# Patient Record
Sex: FEMALE | Race: BLACK OR AFRICAN AMERICAN | Employment: FULL TIME | URBAN - METROPOLITAN AREA
[De-identification: names, ages, dates, MRNs, and addresses within clinical notes are randomized per-mention and may not be internally consistent; named-entity substitution may affect disease eponyms.]

---

## 2018-07-25 PROBLEM — N76.0 BV (BACTERIAL VAGINOSIS): Status: ACTIVE | Noted: 2018-07-25

## 2018-07-25 PROBLEM — B96.89 BV (BACTERIAL VAGINOSIS): Status: ACTIVE | Noted: 2018-07-25

## 2019-04-26 PROBLEM — A59.9 TRICHOMONAS INFECTION: Status: ACTIVE | Noted: 2019-04-26

## 2019-11-14 PROBLEM — R10.2 TENDERNESS OF FEMALE PELVIC ORGANS: Status: ACTIVE | Noted: 2019-11-14

## 2019-11-14 PROBLEM — N94.6 DYSMENORRHEA: Status: ACTIVE | Noted: 2019-11-14

## 2020-10-19 ENCOUNTER — HOSPITAL ENCOUNTER (OUTPATIENT)
Dept: MAMMOGRAPHY | Age: 40
Discharge: HOME | End: 2020-10-19
Attending: OBSTETRICS & GYNECOLOGY
Payer: COMMERCIAL

## 2020-10-19 DIAGNOSIS — N64.89 BREAST ASYMMETRY: ICD-10-CM

## 2020-10-19 PROCEDURE — 76642 ULTRASOUND BREAST LIMITED: CPT

## 2020-10-19 PROCEDURE — 77061 BREAST TOMOSYNTHESIS UNI: CPT

## 2021-10-21 ENCOUNTER — HOSPITAL ENCOUNTER (OUTPATIENT)
Dept: MAMMOGRAPHY | Age: 41
Discharge: HOME | End: 2021-10-21
Attending: OBSTETRICS & GYNECOLOGY
Payer: COMMERCIAL

## 2021-10-21 DIAGNOSIS — Z12.31 VISIT FOR SCREENING MAMMOGRAM: ICD-10-CM

## 2021-10-21 PROCEDURE — 77067 SCR MAMMO BI INCL CAD: CPT

## 2022-03-18 PROBLEM — N94.6 DYSMENORRHEA: Status: ACTIVE | Noted: 2019-11-14

## 2022-03-19 PROBLEM — A59.9 TRICHOMONAS INFECTION: Status: ACTIVE | Noted: 2019-04-26

## 2022-03-19 PROBLEM — N76.0 BV (BACTERIAL VAGINOSIS): Status: ACTIVE | Noted: 2018-07-25

## 2022-03-19 PROBLEM — B96.89 BV (BACTERIAL VAGINOSIS): Status: ACTIVE | Noted: 2018-07-25

## 2022-03-19 PROBLEM — R10.2 TENDERNESS OF FEMALE PELVIC ORGANS: Status: ACTIVE | Noted: 2019-11-14

## 2022-10-17 ENCOUNTER — OFFICE VISIT (OUTPATIENT)
Dept: GYNECOLOGY | Age: 42
End: 2022-10-17
Payer: COMMERCIAL

## 2022-10-17 VITALS
HEIGHT: 65 IN | WEIGHT: 146 LBS | BODY MASS INDEX: 24.32 KG/M2 | SYSTOLIC BLOOD PRESSURE: 124 MMHG | DIASTOLIC BLOOD PRESSURE: 88 MMHG

## 2022-10-17 DIAGNOSIS — N92.1 MENOMETRORRHAGIA: ICD-10-CM

## 2022-10-17 DIAGNOSIS — Z01.419 ENCOUNTER FOR WELL WOMAN EXAM WITH ROUTINE GYNECOLOGICAL EXAM: Primary | ICD-10-CM

## 2022-10-17 DIAGNOSIS — Z76.89 ENCOUNTER TO ESTABLISH CARE: ICD-10-CM

## 2022-10-17 DIAGNOSIS — Z12.4 CERVICAL CANCER SCREENING: ICD-10-CM

## 2022-10-17 PROCEDURE — 99396 PREV VISIT EST AGE 40-64: CPT | Performed by: OBSTETRICS & GYNECOLOGY

## 2022-10-17 PROCEDURE — 76830 TRANSVAGINAL US NON-OB: CPT | Performed by: OBSTETRICS & GYNECOLOGY

## 2022-10-17 RX ORDER — MISOPROSTOL 200 UG/1
TABLET ORAL
Qty: 2 TABLET | Refills: 0 | Status: SHIPPED | OUTPATIENT
Start: 2022-10-17 | End: 2022-10-24 | Stop reason: ALTCHOICE
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
## 2022-10-22 ENCOUNTER — HOSPITAL ENCOUNTER (OUTPATIENT)
Dept: MAMMOGRAPHY | Age: 42
Discharge: HOME | End: 2022-10-25
Payer: COMMERCIAL

## 2022-10-22 DIAGNOSIS — Z12.31 ENCOUNTER FOR SCREENING MAMMOGRAM FOR BREAST CANCER: ICD-10-CM

## 2022-10-22 PROCEDURE — 77063 BREAST TOMOSYNTHESIS BI: CPT

## 2022-10-24 ENCOUNTER — OFFICE VISIT (OUTPATIENT)
Dept: GYNECOLOGY | Age: 42
End: 2022-10-24
Payer: COMMERCIAL

## 2022-10-24 VITALS
SYSTOLIC BLOOD PRESSURE: 118 MMHG | HEIGHT: 65 IN | BODY MASS INDEX: 24.32 KG/M2 | DIASTOLIC BLOOD PRESSURE: 78 MMHG | WEIGHT: 146 LBS

## 2022-10-24 DIAGNOSIS — N85.2 ENLARGED UTERUS: ICD-10-CM

## 2022-10-24 DIAGNOSIS — N80.03 ADENOMYOSIS: ICD-10-CM

## 2022-10-24 DIAGNOSIS — N92.1 MENOMETRORRHAGIA: Primary | ICD-10-CM

## 2022-10-24 PROCEDURE — 99213 OFFICE O/P EST LOW 20 MIN: CPT | Performed by: OBSTETRICS & GYNECOLOGY

## 2022-10-24 NOTE — PROGRESS NOTES
FRANCISCA Segovia is a 43 y.o. female seen for endometrial biopsy for enlarged uterus and adenomyosis. She had an ultrasound on 10/17/22. This patient's been experiencing menometrorrhagia. She bleeds for 7 days and has 5 days of no bleeding followed by another 5 days of spotting. Today we had a failed endometrial biopsy despite Cytotec and numerous attempts at cervical dilatation. I discussed the patient with oncology Dr. Gosia Dee will see the patient for consideration of robotic hysterectomy and bilateral salpingectomy with some staging if deemed necessary. Past Medical History, Past Surgical History, Family history, Social History, and Medications were all reviewed with the patient today and updated as necessary. Current Outpatient Medications   Medication Sig    Probiotic Product (PROBIOTIC-10 PO) Take by mouth    Ferrous Sulfate (SLOW FE PO) Take by mouth    Multiple Vitamins-Minerals (MULTIVITAL PO) Take by mouth     No current facility-administered medications for this visit.      No Known Allergies  Past Medical History:   Diagnosis Date    Anemia     Diabetes ([address]     GDM    PCOS (polycystic ovarian syndrome) 2013    Polycystic disease, ovaries      Past Surgical History:   Procedure Laterality Date     SECTION  ; ; 2015    x 3     Family History   Problem Relation Age of Onset    Heart Disease Mother         CHF    Colon Cancer Paternal Grandmother     Heart Disease Maternal Grandmother     Lung Disease Father     Other Father         sarcoidosis    Heart Disease Maternal Grandfather     Cancer Mother 76        Bone    High Cholesterol Mother       Social History     Tobacco Use    Smoking status: Never    Smokeless tobacco: Never   Substance Use Topics    Alcohol use: Yes       Social History     Substance and Sexual Activity   Sexual Activity Yes    Partners: Male    Birth control/protection: None     OB History    Para Term  AB Living   3 0 3 0 0 3   SAB IAB Ectopic Molar Multiple Live Births   0 0 0 0 0 0       Health Maintenance  Mammogram:   Colonoscopy:   Bone Density:    ROS:    Review of Systems  General: Not Present- Chills, Fever, Fatigue, Insomnia, Hot flashes/Night sweats, Weight gain  Skin: Not Present- Bruising, Change in Wart/Mole, Excessive Sweating, Itching, Nail Changes, New Lesions, Rash, Skin Color Changes and Ulcer. HEENT: Not Present- Headache, Blurred Vision, Double Vision, Glaucoma, Visual Disturbances, Hearing Loss, Ringing in the Ears, Vertigo, Nose Bleed, Bleeding Gums, Hoarseness and Sore Throat. Neck: Not Present- Neck Pain and Neck Swelling. Respiratory: Not Present- Cough, Difficulty Breathing and Difficulty Breathing on Exertion. Breast: Not Present- Breast Mass, Breast Pain, Breast Swelling, Nipple Discharge, Nipple Pain, Recent Breast Size Changes and Skin Changes. Cardiovascular: Not Present- Abnormal Blood Pressure, Chest Pain, Edema, Fainting / Blacking Out, Palpitations, Shortness of Breath and Swelling of Extremities. Gastrointestinal: Not Present- Abdominal Pain, Abdominal Swelling, Bloating, Change in Bowel Habits, Constipation, Diarrhea, Difficulty Swallowing, Gets full quickly at meals, Nausea, Rectal Bleeding and Vomiting. Female Genitourinary: Not Present- Dysmenorrhea, Dyspareunia, Decreased libido, Excessive Menstrual Bleeding, Menstrual Irregularities, Pelvic Pain, Urinary Complaints, Vaginal Discharge, Vaginal itching/burning, Vaginal odor  Musculoskeletal: Not Present- Joint Pain and Muscle Pain. Neurological: Not Present- Dizziness, Fainting, Headaches and Seizures. Psychiatric: Not Present- Anxiety, Depression, Mood changes and Panic Attacks. Endocrine: Not Present- Appetite Changes, Cold Intolerance, Excessive Thirst, Excessive Urination and Heat Intolerance. Hematology: Not Present- Abnormal Bleeding, Easy Bruising and Enlarged Lymph Nodes.        PHYSICAL EXAM:    /78 (Position: Sitting) Ht 5' 5\" (1.651 m)   Wt 146 lb (66.2 kg)   LMP 10/08/2022   BMI 24.30 kg/m²           Medical problems and test results were reviewed with the patient today. ASSESSMENT and PLAN    1. Menometrorrhagia  -     Vahid Adames MD, Gynecologic Oncology, [place]  2. Enlarged uterus  -     [facility] - Emily Daley MD, Gynecologic Oncology, [place]  3. Adenomyosis  -     [address] [place] [address]  - Emily Daley MD, Gynecologic Oncology, [place]           Time:  I spent  30 minutes in preparing to see patient (including chart review and preparation), obtaining and/or reviewing additional medical history, performing a physical exam and evaluation, documenting clinical information in the electronic health record, independently interpreting results, communicating results to patient, family or caregiver, and/or coordinating care. No follow-ups on file.        Nicola Pickard MD

## 2022-10-26 LAB
CYTOLOGIST CVX/VAG CYTO: NORMAL
CYTOLOGY CVX/VAG DOC THIN PREP: NORMAL
HPV REFLEX: NORMAL
Lab: NORMAL
Lab: NORMAL
PATH REPORT.FINAL DX SPEC: NORMAL
STAT OF ADQ CVX/VAG CYTO-IMP: NORMAL

## 2022-11-18 NOTE — PROGRESS NOTES
Date: 2022        Patient Name: Lia Smith     YOB: 1980          Chief Complaint     Chief Complaint   Patient presents with    New Patient      Menorrhagia/metrorrhagia, chroinc   With enlarged uterus, 'adenomyosis' on office ultrasound   Unable to complete endo bx in office    History of Present Illness   Lia Smith is a 43 y.o. who presents today for scheduled visit. Ref: dr Germaine Tanner    Pt with several years of prolonged menses, but no pain  Has failed ocps and iud attempts at managment    Past Medical History     Past Medical History:   Diagnosis Date    Anemia     Diabetes ([address]     GDM    PCOS (polycystic ovarian syndrome) 2013    Polycystic disease, ovaries         Past Surgical History     Past Surgical History:   Procedure Laterality Date     SECTION  ; ; 2015    x 3        Medications      Current Outpatient Medications:     Ascorbic Acid (VITAMIN C) 250 MG tablet, Take 250 mg by mouth daily, Disp: , Rfl:     Probiotic Product (PROBIOTIC-10 PO), Take by mouth, Disp: , Rfl:     Ferrous Sulfate (SLOW FE PO), Take by mouth, Disp: , Rfl:     Multiple Vitamins-Minerals (MULTIVITAL PO), Take by mouth, Disp: , Rfl:      Allergies   Patient has no known allergies.     Social History     Social History       Tobacco History       Smoking Status  Never      Smokeless Tobacco Use  Never              Alcohol History       Alcohol Use Status  Yes              Drug Use       Drug Use Status  No              Sexual Activity       Sexually Active  Yes Partners  Male Birth Control/Protection  None                    Family History     Family History   Problem Relation Age of Onset    Heart Disease Mother         CHF    Colon Cancer Paternal Grandmother     Heart Disease Maternal Grandmother     Lung Disease Father     Other Father         sarcoidosis    Heart Disease Maternal Grandfather     Cancer Mother 76        Bone    High Cholesterol Mother Review of Systems   Review of Systems   Genitourinary:  Positive for menstrual problem. Physical Exam     ECOG PERFORMANCE STATUS - 0-Fully active, able to carry on all pre-disease performance without restriction. Blood pressure 128/81, pulse 80, temperature 98.7 °F (37.1 °C), temperature source Oral, resp. rate 14, height 5' 5\" (1.651 m), weight 149 lb (67.6 kg), SpO2 100 %. Physical Exam  Vitals and nursing note reviewed. Exam conducted with a chaperone present. Constitutional:       Appearance: She is normal weight. Cardiovascular:      Rate and Rhythm: Normal rate. Heart sounds: Normal heart sounds. Pulmonary:      Effort: Pulmonary effort is normal. No respiratory distress. Neurological:      Mental Status: She is alert. Psychiatric:         Mood and Affect: Mood normal.         Behavior: Behavior normal.         Thought Content: Thought content normal.         Judgment: Judgment normal.       Labs        No results found for this or any previous visit (from the past 48 hour(s)). No results found for:      Pathology      Component Ref Range & Units 10/17/22 0934 9/14/21 1642 8/12/20 1620 7/30/19 0846 6/27/17 1708 12/31/13 1426   Diagnosis:   Comment  Comment R, [facility]  Comment R, CM  Comment R, [facility]  Comment R, CM  Comment R, [facility]    Comment: (NOTE)   NEGATIVE FOR INTRAEPITHELIAL LESION OR MALIGNANCY. THIS SPECIMEN WAS RESCREENED AS PART OF OUR  PROGRAM.        Imaging/Diagnostics Last 24 Hours   No results found. Radiology:    CT Result (most recent):  No results found for this or any previous visit from the past 3650 days. PET Results (most recent):  No results found for this or any previous visit from the past 365 days.     Mammo Results (most recent):  [facility] DEVORA DIGITAL SCREEN BILATERAL 10/22/2022    Narrative  STUDY:  Bilateral digital screening mammogram with CAD and Tomosynthesis    INDICATION:  Screening,    COMPARISON: 10/21/2021, 2020    BREAST DENSITY: The breasts are heterogeneously dense, which may obscure small  masses. (#BDC)    FINDINGS: Bilateral digital mammography was performed, and is interpreted in  conjunction with a computer assisted detection (CAD) system. No suspicious  masses, calcifications, or architectural distortion are identified. There is no  skin thickening or nipple retraction. Bilateral breast tomosynthesis was performed, and is interpreted in conjunction  with a computer assisted detection (CAD) system. No suspicious masses,  calcifications, or architectural distortion are identified otherwise. Impression  No mammographic evidence of malignancy. Recommend annual mammogram in one year. A reminder letter will be scheduled. BI-RADS Assessment Category 1: Negative         US Result (most recent):  US NON OB TRANSVAGINAL     Impression  Gyn Report [facility] Page  [facility]  Patient / Exam Information Date of Exam: 10/17/2022  Name: Corey Mays. Phys: Dr. Arben Duncan  Patient ID: [id number] : 1980 Ref. Phys:  Age: 43 Sonographer: Janet Villatoro RDMS  Indication: aub Sex: Female Exam Type: TV GYN  LMP  LMP Day of Cycle  AB  Day of stim. Expected Ovul. Para Ectopic  2D Measurements Value m1 m2 m3 m4 m5 m6 Meth. Uterus  Length 11.15 cm 11.15 avg. Width 6.87 cm 6.87 avg. Height 5.94 cm 5.94 avg. Volume 238.241 cm³ 238.241  Endo. Thickness 8.54 mm 8.54 avg. Left Ovary  Length 3.98 cm 3.98 avg. Width 2.48 cm 2.48 avg. Height 2.52 cm 2.52 avg. Volume 13.024 cm³ 13.024  Right Ovary  Length 3.70 cm 3.70 avg. Width 2.92 cm 2.92 avg. Height 2.47 cm 2.47 avg. Volume 13.973 cm³ 13.973  Comment  [id number]----[id number]  HISTORY: Heavy period for first 3 days then after she stops period she spots for several days.   COMPARISON: Ultrasound 19---heterogeneous enlarged uterus  ---------------------------------------------------------------------------------------------------------------  Enlarged uterus = 9wks with diffuse adenomyosis. Endometrium = 8.5mm and appears normal  Rt ovary is normal.  Lt ovary is normal.  No free fluid noted in the pelvis. Date: 10/17/2022 Perf. Physician: Dr. Melanie Dutta: Sosa Carter RDMS         Assessment       Diagnosis Orders   1. Menometrorrhagia        2. Abnormal uterine bleeding due to adenomyosis          Specialty Problems    None      Plan   1.   Offered to consider hyst form management, pt wishes to furhter consider, so will then proceed with d and c hysteroscopy  Rba reviewed              Homer Zelaya MD  [address] [place] [address] Hematology and Oncology  [phone] Dr [place] [id number]  Office : [phone]  Fax : [phone]

## 2022-11-22 ENCOUNTER — HOSPITAL ENCOUNTER (OUTPATIENT)
Dept: LAB | Age: 42
Discharge: HOME | End: 2022-11-25

## 2022-11-22 ENCOUNTER — OFFICE VISIT (OUTPATIENT)
Dept: ONCOLOGY | Age: 42
End: 2022-11-22
Payer: COMMERCIAL

## 2022-11-22 VITALS
RESPIRATION RATE: 14 BRPM | HEIGHT: 65 IN | HEART RATE: 80 BPM | OXYGEN SATURATION: 100 % | WEIGHT: 149 LBS | SYSTOLIC BLOOD PRESSURE: 128 MMHG | DIASTOLIC BLOOD PRESSURE: 81 MMHG | BODY MASS INDEX: 24.83 KG/M2 | TEMPERATURE: 98.7 F

## 2022-11-22 DIAGNOSIS — N92.1 MENOMETRORRHAGIA: Primary | ICD-10-CM

## 2022-11-22 DIAGNOSIS — N93.9 ABNORMAL UTERINE BLEEDING DUE TO ADENOMYOSIS: ICD-10-CM

## 2022-11-22 DIAGNOSIS — N80.03 ABNORMAL UTERINE BLEEDING DUE TO ADENOMYOSIS: ICD-10-CM

## 2022-11-22 PROCEDURE — 99203 OFFICE O/P NEW LOW 30 MIN: CPT | Performed by: OBSTETRICS & GYNECOLOGY

## 2022-11-22 RX ORDER — MULTIVIT WITH MINERALS/LUTEIN
250 TABLET ORAL DAILY
COMMUNITY

## 2022-11-22 ASSESSMENT — PATIENT HEALTH QUESTIONNAIRE - PHQ9
SUM OF ALL RESPONSES TO PHQ QUESTIONS 1-9: 0
SUM OF ALL RESPONSES TO PHQ9 QUESTIONS 1 & 2: 0
SUM OF ALL RESPONSES TO PHQ QUESTIONS 1-9: 0
2. FEELING DOWN, DEPRESSED OR HOPELESS: 0
1. LITTLE INTEREST OR PLEASURE IN DOING THINGS: 0

## 2022-11-22 NOTE — PATIENT INSTRUCTIONS
Patient Instructions from Today's Visit    Reason for Visit:  New Patient    Diagnosis Information:  [email or web address]-[email or web address]-[email or web address]-[email or web address]      Plan:  Surgery D&C hysteroscopy    Follow Up: After surgery    Recent Lab Results:  N/a    Treatment Summary has been discussed and given to patient: n/a        -------------------------------------------------------------------------------------------------------------------    Patient does express an interest in My Chart. My Chart log in information explained on the after visit summary printout at the [facility] Yoandy Daniels  desk.     Sabas Dodd, RN, MSN, OCN  Gynecology Nurse Navigator for Dr. Stalin Lake  [address] [place] [address]  Phone:  [phone]  Fax: [phone]  Email: [email or web address]

## 2022-12-01 ENCOUNTER — PREP FOR PROCEDURE (OUTPATIENT)
Dept: ONCOLOGY | Age: 42
End: 2022-12-01

## 2022-12-01 PROBLEM — N93.9 ABNORMAL UTERINE BLEEDING DUE TO ADENOMYOSIS: Status: ACTIVE | Noted: 2022-12-01

## 2022-12-01 PROBLEM — N80.03 ABNORMAL UTERINE BLEEDING DUE TO ADENOMYOSIS: Status: ACTIVE | Noted: 2022-12-01

## 2022-12-15 ENCOUNTER — PREP FOR PROCEDURE (OUTPATIENT)
Dept: ONCOLOGY | Age: 42
End: 2022-12-15

## 2022-12-15 RX ORDER — SODIUM CHLORIDE 9 MG/ML
INJECTION, SOLUTION INTRAVENOUS PRN
OUTPATIENT
Start: 2022-12-15

## 2022-12-15 RX ORDER — SODIUM CHLORIDE 0.9 % (FLUSH) 0.9 %
5-40 SYRINGE (ML) INJECTION EVERY 12 HOURS SCHEDULED
OUTPATIENT
Start: 2022-12-15

## 2022-12-15 RX ORDER — SODIUM CHLORIDE 0.9 % (FLUSH) 0.9 %
5-40 SYRINGE (ML) INJECTION PRN
OUTPATIENT
Start: 2022-12-15

## 2022-12-16 ENCOUNTER — TELEPHONE (OUTPATIENT)
Dept: ONCOLOGY | Age: 42
End: 2022-12-16

## 2022-12-19 ENCOUNTER — ANESTHESIA EVENT (OUTPATIENT)
Dept: SURGERY | Age: 42
End: 2022-12-19
Payer: COMMERCIAL

## 2022-12-19 NOTE — PERIOP NOTE
Directly informed patient and or family member of pre op arrival time 1000 on 12/20. All questions answered. Pre op instructions reviewed. Left contact information for any additional questions or needs.

## 2022-12-19 NOTE — PERIOP NOTE
Patient verified name and . Order for consent  found in EHR and matches case posting; patient verifies procedure. Type 1B surgery, Phone assessment complete. Orders  received. Labs per surgeon: H/H DOS  Labs per anesthesia protocol: no additional.    Patient answered medical/surgical history questions at their best of ability. All prior to admission medications documented in [facility]. Patient instructed to take the following medications the day of surgery according to anesthesia guidelines with a small sip of water: none. Hold all vitamins 7 days prior to surgery and NSAIDS 5 days prior to surgery. Prescription meds to hold:Vitamin D, Multivitamin, Vitamin C, and Apple Cider Vinegar. Patient instructed on the following:    > Arrive at [facility], time of arrival to be called the day before by 1700  > NPO after midnight, unless otherwise indicated, including gum, mints, and ice chips  > Responsible adult must drive patient to the hospital, stay during surgery, and patient will need supervision 24 hours after anesthesia  > Use antibacterial Soap in shower the night before surgery and on the morning of surgery  > All piercings must be removed prior to arrival.    > Leave all valuables (money and jewelry) at home but bring insurance card and ID on DOS.   > You may be required to pay a deductible or co-pay on the day of your procedure. You can pre-pay by calling [phone] if your surgery is at the [facility] or [phone] if your surgery is at the [facility]. > Do not wear make-up, nail polish, lotions, cologne, perfumes, powders, or oil on skin. Artificial nails are not permitted.

## 2022-12-20 ENCOUNTER — HOSPITAL ENCOUNTER (OUTPATIENT)
Age: 42
Setting detail: OUTPATIENT SURGERY
Discharge: HOME | End: 2022-12-20
Attending: OBSTETRICS & GYNECOLOGY | Admitting: OBSTETRICS & GYNECOLOGY
Payer: COMMERCIAL

## 2022-12-20 ENCOUNTER — ANESTHESIA (OUTPATIENT)
Dept: SURGERY | Age: 42
End: 2022-12-20
Payer: COMMERCIAL

## 2022-12-20 VITALS
BODY MASS INDEX: 24.69 KG/M2 | OXYGEN SATURATION: 99 % | RESPIRATION RATE: 18 BRPM | SYSTOLIC BLOOD PRESSURE: 145 MMHG | HEART RATE: 67 BPM | TEMPERATURE: 97.8 F | WEIGHT: 148.2 LBS | HEIGHT: 65 IN | DIASTOLIC BLOOD PRESSURE: 79 MMHG

## 2022-12-20 DIAGNOSIS — N80.03 ABNORMAL UTERINE BLEEDING DUE TO ADENOMYOSIS: ICD-10-CM

## 2022-12-20 DIAGNOSIS — N93.9 ABNORMAL UTERINE BLEEDING DUE TO ADENOMYOSIS: ICD-10-CM

## 2022-12-20 LAB
HCG UR QL: NEGATIVE
HCT VFR BLD AUTO: 42.6 % (ref 35.8–46.3)
HGB BLD-MCNC: 13.2 G/DL (ref 11.7–15.4)

## 2022-12-20 PROCEDURE — 3600000013 HC SURGERY LEVEL 3 ADDTL 15MIN: Performed by: OBSTETRICS & GYNECOLOGY

## 2022-12-20 PROCEDURE — 3600000003 HC SURGERY LEVEL 3 BASE: Performed by: OBSTETRICS & GYNECOLOGY

## 2022-12-20 PROCEDURE — 7100000001 HC PACU RECOVERY - ADDTL 15 MIN: Performed by: OBSTETRICS & GYNECOLOGY

## 2022-12-20 PROCEDURE — 7100000011 HC PHASE II RECOVERY - ADDTL 15 MIN: Performed by: OBSTETRICS & GYNECOLOGY

## 2022-12-20 PROCEDURE — 7100000000 HC PACU RECOVERY - FIRST 15 MIN: Performed by: OBSTETRICS & GYNECOLOGY

## 2022-12-20 PROCEDURE — 7100000010 HC PHASE II RECOVERY - FIRST 15 MIN: Performed by: OBSTETRICS & GYNECOLOGY

## 2022-12-20 PROCEDURE — 88305 TISSUE EXAM BY PATHOLOGIST: CPT

## 2022-12-20 PROCEDURE — 3700000000 HC ANESTHESIA ATTENDED CARE: Performed by: OBSTETRICS & GYNECOLOGY

## 2022-12-20 PROCEDURE — 81025 URINE PREGNANCY TEST: CPT

## 2022-12-20 PROCEDURE — 3700000001 HC ADD 15 MINUTES (ANESTHESIA): Performed by: OBSTETRICS & GYNECOLOGY

## 2022-12-20 PROCEDURE — 6360000002 HC RX W HCPCS: Performed by: NURSE ANESTHETIST, CERTIFIED REGISTERED

## 2022-12-20 PROCEDURE — 2709999900 HC NON-CHARGEABLE SUPPLY: Performed by: OBSTETRICS & GYNECOLOGY

## 2022-12-20 PROCEDURE — 2580000003 HC RX 258: Performed by: ANESTHESIOLOGY

## 2022-12-20 PROCEDURE — 2500000003 HC RX 250 WO HCPCS: Performed by: NURSE ANESTHETIST, CERTIFIED REGISTERED

## 2022-12-20 PROCEDURE — 85014 HEMATOCRIT: CPT

## 2022-12-20 PROCEDURE — 6370000000 HC RX 637 (ALT 250 FOR IP): Performed by: ANESTHESIOLOGY

## 2022-12-20 PROCEDURE — 6360000002 HC RX W HCPCS: Performed by: OBSTETRICS & GYNECOLOGY

## 2022-12-20 RX ORDER — LIDOCAINE HYDROCHLORIDE 20 MG/ML
INJECTION, SOLUTION EPIDURAL; INFILTRATION; INTRACAUDAL; PERINEURAL PRN
Status: DISCONTINUED | OUTPATIENT
Start: 2022-12-20 | End: 2022-12-20 | Stop reason: SDUPTHER

## 2022-12-20 RX ORDER — ACETAMINOPHEN 500 MG
1000 TABLET ORAL ONCE
Status: COMPLETED | OUTPATIENT
Start: 2022-12-20 | End: 2022-12-20

## 2022-12-20 RX ORDER — KETOROLAC TROMETHAMINE 30 MG/ML
INJECTION, SOLUTION INTRAMUSCULAR; INTRAVENOUS PRN
Status: DISCONTINUED | OUTPATIENT
Start: 2022-12-20 | End: 2022-12-20 | Stop reason: SDUPTHER

## 2022-12-20 RX ORDER — PROPOFOL 10 MG/ML
INJECTION, EMULSION INTRAVENOUS PRN
Status: DISCONTINUED | OUTPATIENT
Start: 2022-12-20 | End: 2022-12-20 | Stop reason: SDUPTHER

## 2022-12-20 RX ORDER — MIDAZOLAM HYDROCHLORIDE 2 MG/2ML
2 INJECTION, SOLUTION INTRAMUSCULAR; INTRAVENOUS
Status: DISCONTINUED | OUTPATIENT
Start: 2022-12-20 | End: 2022-12-20 | Stop reason: HOSPADM

## 2022-12-20 RX ORDER — ONDANSETRON 4 MG/1
4 TABLET, FILM COATED ORAL 3 TIMES DAILY PRN
Qty: 15 TABLET | Refills: 0 | Status: SHIPPED | OUTPATIENT
Start: 2022-12-20

## 2022-12-20 RX ORDER — LABETALOL HYDROCHLORIDE 5 MG/ML
10 INJECTION, SOLUTION INTRAVENOUS
Status: DISCONTINUED | OUTPATIENT
Start: 2022-12-20 | End: 2022-12-20 | Stop reason: HOSPADM

## 2022-12-20 RX ORDER — IBUPROFEN 800 MG/1
800 TABLET ORAL 3 TIMES DAILY PRN
Qty: 10 TABLET | Refills: 0 | Status: SHIPPED | OUTPATIENT
Start: 2022-12-20

## 2022-12-20 RX ORDER — HYDRALAZINE HYDROCHLORIDE 20 MG/ML
10 INJECTION INTRAMUSCULAR; INTRAVENOUS
Status: DISCONTINUED | OUTPATIENT
Start: 2022-12-20 | End: 2022-12-20 | Stop reason: HOSPADM

## 2022-12-20 RX ORDER — ONDANSETRON 2 MG/ML
INJECTION INTRAMUSCULAR; INTRAVENOUS PRN
Status: DISCONTINUED | OUTPATIENT
Start: 2022-12-20 | End: 2022-12-20 | Stop reason: SDUPTHER

## 2022-12-20 RX ORDER — LIDOCAINE HYDROCHLORIDE 10 MG/ML
1 INJECTION, SOLUTION INFILTRATION; PERINEURAL
Status: DISCONTINUED | OUTPATIENT
Start: 2022-12-20 | End: 2022-12-20 | Stop reason: HOSPADM

## 2022-12-20 RX ORDER — SODIUM CHLORIDE 0.9 % (FLUSH) 0.9 %
5-40 SYRINGE (ML) INJECTION EVERY 12 HOURS SCHEDULED
Status: DISCONTINUED | OUTPATIENT
Start: 2022-12-20 | End: 2022-12-20 | Stop reason: HOSPADM

## 2022-12-20 RX ORDER — EPHEDRINE SULFATE/0.9% NACL/PF 50 MG/5 ML
SYRINGE (ML) INTRAVENOUS PRN
Status: DISCONTINUED | OUTPATIENT
Start: 2022-12-20 | End: 2022-12-20 | Stop reason: SDUPTHER

## 2022-12-20 RX ORDER — SODIUM CHLORIDE, SODIUM LACTATE, POTASSIUM CHLORIDE, CALCIUM CHLORIDE 600; 310; 30; 20 MG/100ML; MG/100ML; MG/100ML; MG/100ML
INJECTION, SOLUTION INTRAVENOUS CONTINUOUS
Status: DISCONTINUED | OUTPATIENT
Start: 2022-12-20 | End: 2022-12-20 | Stop reason: HOSPADM

## 2022-12-20 RX ORDER — ONDANSETRON 2 MG/ML
4 INJECTION INTRAMUSCULAR; INTRAVENOUS
Status: DISCONTINUED | OUTPATIENT
Start: 2022-12-20 | End: 2022-12-20 | Stop reason: HOSPADM

## 2022-12-20 RX ORDER — SODIUM CHLORIDE 9 MG/ML
INJECTION, SOLUTION INTRAVENOUS PRN
Status: DISCONTINUED | OUTPATIENT
Start: 2022-12-20 | End: 2022-12-20 | Stop reason: HOSPADM

## 2022-12-20 RX ORDER — SODIUM CHLORIDE 0.9 % (FLUSH) 0.9 %
5-40 SYRINGE (ML) INJECTION PRN
Status: DISCONTINUED | OUTPATIENT
Start: 2022-12-20 | End: 2022-12-20 | Stop reason: HOSPADM

## 2022-12-20 RX ORDER — PROCHLORPERAZINE EDISYLATE 5 MG/ML
5 INJECTION INTRAMUSCULAR; INTRAVENOUS
Status: DISCONTINUED | OUTPATIENT
Start: 2022-12-20 | End: 2022-12-20 | Stop reason: HOSPADM

## 2022-12-20 RX ORDER — HYDROMORPHONE HCL 110MG/55ML
0.5 PATIENT CONTROLLED ANALGESIA SYRINGE INTRAVENOUS EVERY 5 MIN PRN
Status: DISCONTINUED | OUTPATIENT
Start: 2022-12-20 | End: 2022-12-20 | Stop reason: HOSPADM

## 2022-12-20 RX ORDER — OXYCODONE HYDROCHLORIDE 5 MG/1
5 TABLET ORAL
Status: DISCONTINUED | OUTPATIENT
Start: 2022-12-20 | End: 2022-12-20 | Stop reason: HOSPADM

## 2022-12-20 RX ORDER — DEXAMETHASONE SODIUM PHOSPHATE 4 MG/ML
INJECTION, SOLUTION INTRA-ARTICULAR; INTRALESIONAL; INTRAMUSCULAR; INTRAVENOUS; SOFT TISSUE PRN
Status: DISCONTINUED | OUTPATIENT
Start: 2022-12-20 | End: 2022-12-20 | Stop reason: SDUPTHER

## 2022-12-20 RX ORDER — FENTANYL CITRATE 50 UG/ML
100 INJECTION, SOLUTION INTRAMUSCULAR; INTRAVENOUS
Status: DISCONTINUED | OUTPATIENT
Start: 2022-12-20 | End: 2022-12-20 | Stop reason: HOSPADM

## 2022-12-20 RX ORDER — HYDROMORPHONE HCL 110MG/55ML
0.25 PATIENT CONTROLLED ANALGESIA SYRINGE INTRAVENOUS EVERY 5 MIN PRN
Status: DISCONTINUED | OUTPATIENT
Start: 2022-12-20 | End: 2022-12-20 | Stop reason: HOSPADM

## 2022-12-20 RX ADMIN — ONDANSETRON 4 MG: 2 INJECTION INTRAMUSCULAR; INTRAVENOUS at 14:51

## 2022-12-20 RX ADMIN — KETOROLAC TROMETHAMINE 30 MG: 30 INJECTION, SOLUTION INTRAMUSCULAR; INTRAVENOUS at 15:21

## 2022-12-20 RX ADMIN — FENTANYL CITRATE 50 MCG: 50 INJECTION INTRAMUSCULAR; INTRAVENOUS at 15:18

## 2022-12-20 RX ADMIN — DEXAMETHASONE SODIUM PHOSPHATE 4 MG: 4 INJECTION, SOLUTION INTRAMUSCULAR; INTRAVENOUS at 14:51

## 2022-12-20 RX ADMIN — SODIUM CHLORIDE, SODIUM LACTATE, POTASSIUM CHLORIDE, AND CALCIUM CHLORIDE: 600; 310; 30; 20 INJECTION, SOLUTION INTRAVENOUS at 14:35

## 2022-12-20 RX ADMIN — LIDOCAINE HYDROCHLORIDE 60 MG: 20 INJECTION, SOLUTION EPIDURAL; INFILTRATION; INTRACAUDAL; PERINEURAL at 14:45

## 2022-12-20 RX ADMIN — PROPOFOL 100 MG: 10 INJECTION, EMULSION INTRAVENOUS at 14:48

## 2022-12-20 RX ADMIN — Medication 10 MG: at 15:10

## 2022-12-20 RX ADMIN — ACETAMINOPHEN 1000 MG: 500 TABLET, FILM COATED ORAL at 11:12

## 2022-12-20 RX ADMIN — PROPOFOL 200 MG: 10 INJECTION, EMULSION INTRAVENOUS at 14:45

## 2022-12-20 RX ADMIN — Medication 2000 MG: at 14:50

## 2022-12-20 RX ADMIN — Medication 10 MG: at 14:59

## 2022-12-20 ASSESSMENT — PAIN - FUNCTIONAL ASSESSMENT
PAIN_FUNCTIONAL_ASSESSMENT: NONE - DENIES PAIN
PAIN_FUNCTIONAL_ASSESSMENT: 0-10
PAIN_FUNCTIONAL_ASSESSMENT: 0-10

## 2022-12-20 NOTE — ANESTHESIA PRE PROCEDURE
Department of Anesthesiology  Preprocedure Note       Name:  Lennie Moraes   Age:  43 y.o.  :  1980                                          MRN:  [id number]         Date:  2022      Surgeon: Sabra Davalos):  Dustin Lopez MD    Procedure: Procedure(s):  DILATATION AND CURETTAGE HYSTEROSCOPY    Medications prior to admission:   Prior to Admission medications    Medication Sig Start Date End Date Taking?  Authorizing Provider   VITAMIN D PO Take by mouth nightly   Yes Historical Provider, MD   APPLE CIDER VINEGAR PO Take by mouth as needed   Yes Historical Provider, MD   Ascorbic Acid (VITAMIN C) 250 MG tablet Take 250 mg by mouth nightly    Historical Provider, MD   Probiotic Product (PROBIOTIC-10 PO) Take by mouth nightly    Historical Provider, MD   Ferrous Sulfate (SLOW FE PO) Take by mouth nightly 22   Historical Provider, MD   Multiple Vitamins-Minerals (MULTIVITAL PO) Take by mouth nightly 22   Historical Provider, MD       Current medications:    Current Facility-Administered Medications   Medication Dose Route Frequency Provider Last Rate Last Admin    lidocaine 1 % injection 1 mL  1 mL IntraDERmal Once PRN Antonio Agustin DO        fentaNYL (SUBLIMAZE) injection 100 mcg  100 mcg IntraVENous Once PRN Antonio Agustin DO        lactated ringers infusion   IntraVENous Continuous Antonio Francois DO        midazolam PF (VERSED) injection 2 mg  2 mg IntraVENous Once PRN Antonio Agustin DO        sodium chloride flush 0.9 % injection 5-40 mL  5-40 mL IntraVENous 2 times per day Dustin Lopez MD        sodium chloride flush 0.9 % injection 5-40 mL  5-40 mL IntraVENous PRN Dustin Lopez MD        0.9 % sodium chloride infusion   IntraVENous PRN Dustin Lopez MD        ceFAZolin (ANCEF) 2000 mg in sterile water 20 mL IV syringe  2,000 mg IntraVENous On Call to [address] MD Laila           Allergies:  No Known Allergies    Problem List:    Patient Active Problem List   Diagnosis Code    Dysmenorrhea N94.6    Trichomonas infection A59.9    History of  delivery Z98.891    BV (bacterial vaginosis) N76.0, B96.89    Tenderness of female pelvic organs R10.2    PCOS (polycystic ovarian syndrome) E28.2    Menometrorrhagia N92.1    Abnormal uterine bleeding due to adenomyosis N93.9, N80.03       Past Medical History:        Diagnosis Date    Anemia     no recent transfusions    Anesthesia complication     pt states she is slow to wake up    History of gestational diabetes     +    Menometrorrhagia     PCOS (polycystic ovarian syndrome) 2013       Past Surgical History:        Procedure Laterality Date     SECTION  ; ; 2015    x 3       Social History:    Social History     Tobacco Use    Smoking status: Never    Smokeless tobacco: Never   Substance Use Topics    Alcohol use: Not Currently                                Counseling given: Not Answered      Vital Signs (Current):   Vitals:    22 1012 22 1031   BP:  (!) 127/59   Pulse:  75   Resp:  16   Temp:  98.4 °F (36.9 °C)   TempSrc:  Oral   SpO2:  99%   Weight: 146 lb (66.2 kg) 148 lb 3.2 oz (67.2 kg)   Height: 5' 5\" (1.651 m) 5' 5\" (1.651 m)                                              BP Readings from Last 3 Encounters:   22 (!) 127/59   22 128/81   10/24/22 118/78       NPO Status: Time of last liquid consumption: 0000                        Time of last solid consumption: 0000                        Date of last liquid consumption: 22                        Date of last solid food consumption: 22    BMI:   Wt Readings from Last 3 Encounters:   22 148 lb 3.2 oz (67.2 kg)   22 149 lb (67.6 kg)   10/24/22 146 lb (66.2 kg)     Body mass index is 24.66 kg/m².     CBC:   Lab Results   Component Value Date/Time    HGB 13.2 2022 11:06 AM    HCT 42.6 2022 11:06 AM       CMP: No results found for: NA, K, CL, CO2, BUN, CREATININE, GFRAA, AGRATIO, LABGLOM, GLUCOSE, GLU, PROT, CALCIUM, BILITOT, ALKPHOS, AST, ALT    POC Tests: No results for input(s): POCGLU, POCNA, POCK, POCCL, POCBUN, POCHEMO, POCHCT in the last 72 hours. Coags: No results found for: PROTIME, INR, APTT    HCG (If Applicable):   Lab Results   Component Value Date    PREGTESTUR Negative 12/20/2022        ABGs: No results found for: PHART, PO2ART, ZIT3MIG, YBL2DKD, BEART, K4JJGYHZ     Type & Screen (If Applicable):  No results found for: LABABO, LABRH    Drug/Infectious Status (If Applicable):  No results found for: HIV, HEPCAB    COVID-19 Screening (If Applicable): No results found for: COVID19        Anesthesia Evaluation  Patient summary reviewed and Nursing notes reviewed  Airway: Mallampati: I          Dental: normal exam         Pulmonary:Negative Pulmonary ROS breath sounds clear to auscultation                             Cardiovascular:Negative CV ROS  Exercise tolerance: good (>4 METS),           Rhythm: regular  Rate: normal                    Neuro/Psych:   Negative Neuro/Psych ROS              GI/Hepatic/Renal: Neg GI/Hepatic/Renal ROS            Endo/Other: Negative Endo/Other ROS                    Abdominal:             Vascular: negative vascular ROS. Other Findings:           Anesthesia Plan      general     ASA 1       Induction: intravenous. MIPS: Postoperative opioids intended and Prophylactic antiemetics administered. Anesthetic plan and risks discussed with patient and spouse.                         Pako Mckenzie DO   12/20/2022

## 2022-12-20 NOTE — ANESTHESIA PROCEDURE NOTES
Airway  Date/Time: 12/20/2022 2:49 PM  Urgency: elective    Airway not difficult    General Information and Staff    Patient location during procedure: OR  Resident/CRNA: CHELLE Esquivel - ROMARIO    Indications and Patient Condition  Indications for airway management: anesthesia and airway protection  Spontaneous ventilation: present  Sedation level: deep  Preoxygenated: yes  Patient position: sniffing  MILS not maintained throughout  Mask difficulty assessment: vent by bag mask    Final Airway Details  Final airway type: supraglottic airway      Successful airway: oropharyngeal  Size 4     Number of attempts at approach: 1  Ventilation between attempts: bag mask  Number of other approaches attempted: 0    Additional Comments  Lips/dentition as pre-op.

## 2022-12-20 NOTE — BRIEF OP NOTE
Brief Postoperative Note      Patient: Leora Pearson  YOB: 1980  MRN: [id number]    Date of Procedure: 12/20/2022    Pre-Op Diagnosis: Menometrorrhagia [N92.1]  Abnormal uterine bleeding due to adenomyosis [N93.9, N80.03]    Post-Op Diagnosis: Same       Procedure(s):  DILATATION AND CURETTAGE HYSTEROSCOPY    Surgeon(s):  Simone Ruiz MD    Assistant:  * No surgical staff found *    Anesthesia: General    Estimated Blood Loss (mL): Minimal    Complications: None    Specimens:   ID Type Source Tests Collected by Time Destination   A : Endometrial curettings Tissue Uterus SURGICAL PATHOLOGY Simone Ruiz MD 12/20/2022 1521        Implants:  * No implants in log *      Drains: * No LDAs found *    Findings: enlarged cavity, atrophic appearance    Electronically signed by Simone Ruiz MD on 12/20/2022 at 3:28 PM

## 2022-12-20 NOTE — ANESTHESIA POSTPROCEDURE EVALUATION
Department of Anesthesiology  Postprocedure Note    Patient: Gina Palencia  MRN: [id number]  YOB: 1980  Date of evaluation: 12/20/2022      Procedure Summary     Date: 12/20/22 Room / Location: [facility] MAIN OR 02 / [facility] MAIN OR    Anesthesia Start: 2735 Anesthesia Stop: 5313    Procedure: DILATATION AND CURETTAGE HYSTEROSCOPY (Vagina ) Diagnosis:       Menometrorrhagia      Abnormal uterine bleeding due to adenomyosis      (Menometrorrhagia [N92.1])      (Abnormal uterine bleeding due to adenomyosis [N93.9, N80.03])    Providers: Federica Mason MD Responsible Provider: Joanie Gowers Versie Alba, DO    Anesthesia Type: general ASA Status: 1          Anesthesia Type: No value filed.     Carissa Phase I: Carissa Score: 10    Carissa Phase II: Carissa Score: 10      Anesthesia Post Evaluation    Patient location during evaluation: PACU  Level of consciousness: awake and alert  Airway patency: patent  Nausea & Vomiting: no nausea  Complications: no  Cardiovascular status: hemodynamically stable  Respiratory status: acceptable  Hydration status: euvolemic

## 2022-12-21 NOTE — OP NOTE
[facility]  OPERATIVE REPORT    Name:  Juan Jose Liu  MR#:  [id number]  :  1980  ACCOUNT #:  [de-identified]  DATE OF SERVICE:  2022    PREOPERATIVE DIAGNOSIS:  Menometrorrhagia with anemia. POSTOPERATIVE DIAGNOSIS:  Enlarged uterus with no obvious neoplastic change. PROCEDURE PERFORMED:  Dilation and curettage and hysteroscopy. SURGEON:  Federica Mason MD        ANESTHESIA:  General.        SPECIMENS REMOVED:  Pathology above. ESTIMATED BLOOD LOSS:  Minimal.    PACKS:  None. DRAINS:  None    DISPOSITION:  PACU. INDICATIONS:  This is a patient who was referred with abnormal imaging and menorrhagia with need for iron replacement. Risks, benefits, alternatives, morbidities and mortalities were reviewed and she wished to proceed with above. FINDINGS:  Normal cervix. Uterine cavity was enlarged, but appeared atrophic with no obvious intracavitary neoplasm. The single-tooth tenaculum sites had some bleeding and both were sutured with figure-of-eight. Otherwise no bleeding at completion. PROCEDURE:  The patient was taken to the operating room and placed under general anesthesia, sterilely prepped and draped. Cervix grasped anteriorly, gently dilated after sounding and subsequently hysteroscopy with normal saline undertaken with findings as above. Sharp curettage done with minimal tissue return consistent with relatively atrophic appearance. Bleeding was noted from the tenaculum site since both had a 2-0 Vicryl placed with subsequently no bleeding noted. Sponge, lap and needle counts were correct. The patient tolerated the procedure well and was taken to PACU stable.         Rasheeda Ackerman MD      [id number]  D:  2022 15:31  T:  2022 20:47  JOB #:  [id number]

## 2022-12-22 ENCOUNTER — TELEPHONE (OUTPATIENT)
Dept: CASE MANAGEMENT | Age: 42
End: 2022-12-22

## 2022-12-22 NOTE — TELEPHONE ENCOUNTER
Called Manfred Candelario to see how she was after procedure/surgery. She reports doing well. She reports light spotting. She is aware of POV on 1/4/23 at 945. She is aware to call us with any surgery concerns prior to visit.

## 2023-01-03 NOTE — PROGRESS NOTES
Date: 2023        Patient Name: Laura Cazares     YOB: 1980          Chief Complaint     Chief Complaint   Patient presents with    Follow-up      Menorrhagia/metrorrhagia, chronic   Endometrial polyp, d and c, dec 2022   With enlarged uterus, 'adenomyosis' on office ultrasound   Unable to complete endo bx in office    History of Present Illness   Laura Cazares is a 43 y.o. who presents today for scheduled visit.     Ref: dr Servando Mcdowell    Pt with several years of prolonged menses, but no pain  Ocps tried years ago, not well ntolerated(triphasic) and iud attempts at managment    Past Medical History     Past Medical History:   Diagnosis Date    Anemia     no recent transfusions    Anesthesia complication     pt states she is slow to wake up    History of gestational diabetes     +    Menometrorrhagia     PCOS (polycystic ovarian syndrome) 2013        Past Surgical History     Past Surgical History:   Procedure Laterality Date     SECTION  ; ; 2015    x 3    DILATION AND CURETTAGE OF UTERUS N/A 2022    DILATATION AND CURETTAGE HYSTEROSCOPY performed by Kandy Gomez MD at [facility] MAIN OR        Medications      Current Outpatient Medications:     ondansetron (ZOFRAN) 4 MG tablet, Take 1 tablet by mouth 3 times daily as needed for Nausea or Vomiting, Disp: 15 tablet, Rfl: 0    ibuprofen (ADVIL;MOTRIN) 800 MG tablet, Take 1 tablet by mouth 3 times daily as needed for Pain, Disp: 10 tablet, Rfl: 0    VITAMIN D PO, Take by mouth nightly, Disp: , Rfl:     APPLE CIDER VINEGAR PO, Take by mouth as needed, Disp: , Rfl:     Ascorbic Acid (VITAMIN C) 250 MG tablet, Take 250 mg by mouth nightly, Disp: , Rfl:     Probiotic Product (PROBIOTIC-10 PO), Take by mouth nightly, Disp: , Rfl:     Ferrous Sulfate (SLOW FE PO), Take by mouth nightly, Disp: , Rfl:     Multiple Vitamins-Minerals (MULTIVITAL PO), Take by mouth nightly, Disp: , Rfl:      Allergies   Patient has no known allergies. Social History     Social History       Tobacco History       Smoking Status  Never      Smokeless Tobacco Use  Never              Alcohol History       Alcohol Use Status  Yes              Drug Use       Drug Use Status  No              Sexual Activity       Sexually Active  Yes Partners  Male Birth Control/Protection  None                    Family History     Family History   Problem Relation Age of Onset    Heart Disease Mother         CHF    Colon Cancer Paternal Grandmother     Heart Disease Maternal Grandmother     Lung Disease Father     Other Father         sarcoidosis    Heart Disease Maternal Grandfather     Cancer Mother 76        Bone    High Cholesterol Mother        Review of Systems   Review of Systems   Genitourinary:  Positive for menstrual problem. Physical Exam     ECOG PERFORMANCE STATUS - 0-Fully active, able to carry on all pre-disease performance without restriction. Blood pressure 123/67, pulse 67, temperature 98.8 °F (37.1 °C), temperature source Oral, resp. rate 14, height 5' 5\" (1.651 m), weight 146 lb 14.4 oz (66.6 kg), last menstrual period 12/13/2022, SpO2 100 %. Physical Exam    Labs        No results found for this or any previous visit (from the past 48 hour(s)). No results found for:      Pathology      Name:    María Cos   Accession Number:   [id number]   MR #   [id number]   Date Obtained:   12/20/2022   DIAGNOSIS        \"ENDOMETRIAL CURETTINGS\":  EARLY SECRETORY PHASE; FRAGMENTS   CONSISTENT WITH BENIGN ENDOMETRIAL POLYP. Component Ref Range & Units 10/17/22 0934 9/14/21 1642 8/12/20 1620 7/30/19 0846 6/27/17 1708 12/31/13 1426   Diagnosis:   Comment  Comment R, [facility]  Comment R, [facility]  Comment R, [facility]  Comment R, [facility]  Comment R, [facility]    Comment: (NOTE)   NEGATIVE FOR INTRAEPITHELIAL LESION OR MALIGNANCY.    THIS SPECIMEN WAS RESCREENED AS PART OF OUR  PROGRAM.        Imaging/Diagnostics Last 24 Hours No results found. Radiology:    CT Result (most recent):  No results found for this or any previous visit from the past 3650 days. PET Results (most recent):  No results found for this or any previous visit from the past 365 days. Mammo Results (most recent):  PRESTON DEVORA DIGITAL SCREEN BILATERAL 10/22/2022    Narrative  STUDY:  Bilateral digital screening mammogram with CAD and Tomosynthesis    INDICATION:  Screening,    COMPARISON:  10/21/2021, 2020    BREAST DENSITY: The breasts are heterogeneously dense, which may obscure small  masses. (#BDC)    FINDINGS: Bilateral digital mammography was performed, and is interpreted in  conjunction with a computer assisted detection (CAD) system. No suspicious  masses, calcifications, or architectural distortion are identified. There is no  skin thickening or nipple retraction. Bilateral breast tomosynthesis was performed, and is interpreted in conjunction  with a computer assisted detection (CAD) system. No suspicious masses,  calcifications, or architectural distortion are identified otherwise. Impression  No mammographic evidence of malignancy. Recommend annual mammogram in one year. A reminder letter will be scheduled. BI-RADS Assessment Category 1: Negative         US Result (most recent):  US NON OB TRANSVAGINAL     Impression  Gyn Report [facility]  Patient / Exam Information Date of Exam: 10/17/2022  Name: Milagro Lobitowoody. Phys: Dr. Marjorie Payton  Patient ID: [id number] : 1980 Ref. Phys:  Age: 43 Sonographer: Kiley Parikh RDMS  Indication: aub Sex: Female Exam Type: TV GYN  LMP  LMP Day of Cycle  AB  Day of stim. Expected Ovul. Para Ectopic  2D Measurements Value m1 m2 m3 m4 m5 m6 Meth. Uterus  Length 11.15 cm 11.15 avg. Width 6.87 cm 6.87 avg. Height 5.94 cm 5.94 avg. Volume 238.241 cm³ 238.241  Endo. Thickness 8.54 mm 8.54 avg. Left Ovary  Length 3.98 cm 3.98 avg.   Width 2.48 cm 2.48 avg. Height 2.52 cm 2.52 avg. Volume 13.024 cm³ 13.024  Right Ovary  Length 3.70 cm 3.70 avg. Width 2.92 cm 2.92 avg. Height 2.47 cm 2.47 avg. Volume 13.973 cm³ 13.973  Comment  [id number]----fsn  HISTORY: Heavy period for first 3 days then after she stops period she spots for several days. COMPARISON: Ultrasound 7/30/19---heterogeneous enlarged uterus  ---------------------------------------------------------------------------------------------------------------  Enlarged uterus = 9wks with diffuse adenomyosis. Endometrium = 8.5mm and appears normal  Rt ovary is normal.  Lt ovary is normal.  No free fluid noted in the pelvis. Date: 10/17/2022 Perf. Physician: Dr. Rakesh Velazquez: Jefrey Claude, RDMS         Assessment       Diagnosis Orders   1. Endometrial polyp          Specialty Problems    None      Plan       Reviewed path, benign  Reviewed imaging again  Reviewed menorrhagia issues, offered surgery, pt defers  Discussed another trial ocps? ?  Pt to fu with dr Lul Henry as indicated  Fu here prn  100%^ care coordination, tx planned/option discussion, 15min          Carol Kwong MD  [facility] Hematology and Oncology  [address] [place] [id number]  Office : [phone]  Fax : [phone]

## 2023-01-04 ENCOUNTER — OFFICE VISIT (OUTPATIENT)
Dept: ONCOLOGY | Age: 43
End: 2023-01-04
Payer: COMMERCIAL

## 2023-01-04 VITALS
HEART RATE: 67 BPM | DIASTOLIC BLOOD PRESSURE: 67 MMHG | HEIGHT: 65 IN | OXYGEN SATURATION: 100 % | SYSTOLIC BLOOD PRESSURE: 123 MMHG | BODY MASS INDEX: 24.47 KG/M2 | RESPIRATION RATE: 14 BRPM | TEMPERATURE: 98.8 F | WEIGHT: 146.9 LBS

## 2023-01-04 DIAGNOSIS — N84.0 ENDOMETRIAL POLYP: Primary | ICD-10-CM

## 2023-01-04 PROCEDURE — 99212 OFFICE O/P EST SF 10 MIN: CPT | Performed by: OBSTETRICS & GYNECOLOGY

## 2023-01-04 ASSESSMENT — PATIENT HEALTH QUESTIONNAIRE - PHQ9
SUM OF ALL RESPONSES TO PHQ QUESTIONS 1-9: 0
SUM OF ALL RESPONSES TO PHQ9 QUESTIONS 1 & 2: 0
2. FEELING DOWN, DEPRESSED OR HOPELESS: 0
SUM OF ALL RESPONSES TO PHQ QUESTIONS 1-9: 0
1. LITTLE INTEREST OR PLEASURE IN DOING THINGS: 0

## 2023-02-28 SDOH — HEALTH STABILITY: PHYSICAL HEALTH: ON AVERAGE, HOW MANY MINUTES DO YOU ENGAGE IN EXERCISE AT THIS LEVEL?: 20 MIN

## 2023-02-28 SDOH — HEALTH STABILITY: PHYSICAL HEALTH: ON AVERAGE, HOW MANY DAYS PER WEEK DO YOU ENGAGE IN MODERATE TO STRENUOUS EXERCISE (LIKE A BRISK WALK)?: 2 DAYS

## 2023-03-01 ENCOUNTER — TELEPHONE (OUTPATIENT)
Dept: INTERNAL MEDICINE CLINIC | Facility: CLINIC | Age: 43
End: 2023-03-01

## 2023-03-01 ENCOUNTER — OFFICE VISIT (OUTPATIENT)
Dept: INTERNAL MEDICINE CLINIC | Facility: CLINIC | Age: 43
End: 2023-03-01
Payer: COMMERCIAL

## 2023-03-01 VITALS
WEIGHT: 149 LBS | OXYGEN SATURATION: 98 % | HEART RATE: 76 BPM | HEIGHT: 65 IN | DIASTOLIC BLOOD PRESSURE: 80 MMHG | SYSTOLIC BLOOD PRESSURE: 120 MMHG | BODY MASS INDEX: 24.83 KG/M2 | TEMPERATURE: 97.7 F

## 2023-03-01 DIAGNOSIS — Z00.00 PHYSICAL EXAM: Primary | ICD-10-CM

## 2023-03-01 DIAGNOSIS — Z11.59 NEED FOR HEPATITIS C SCREENING TEST: ICD-10-CM

## 2023-03-01 PROCEDURE — 99386 PREV VISIT NEW AGE 40-64: CPT | Performed by: NURSE PRACTITIONER

## 2023-03-01 SDOH — ECONOMIC STABILITY: INCOME INSECURITY: HOW HARD IS IT FOR YOU TO PAY FOR THE VERY BASICS LIKE FOOD, HOUSING, MEDICAL CARE, AND HEATING?: NOT HARD AT ALL

## 2023-03-01 SDOH — ECONOMIC STABILITY: FOOD INSECURITY: WITHIN THE PAST 12 MONTHS, THE FOOD YOU BOUGHT JUST DIDN'T LAST AND YOU DIDN'T HAVE MONEY TO GET MORE.: NEVER TRUE

## 2023-03-01 SDOH — ECONOMIC STABILITY: FOOD INSECURITY: WITHIN THE PAST 12 MONTHS, YOU WORRIED THAT YOUR FOOD WOULD RUN OUT BEFORE YOU GOT MONEY TO BUY MORE.: NEVER TRUE

## 2023-03-01 SDOH — ECONOMIC STABILITY: HOUSING INSECURITY
IN THE LAST 12 MONTHS, WAS THERE A TIME WHEN YOU DID NOT HAVE A STEADY PLACE TO SLEEP OR SLEPT IN A SHELTER (INCLUDING NOW)?: NO

## 2023-03-01 ASSESSMENT — PATIENT HEALTH QUESTIONNAIRE - PHQ9
SUM OF ALL RESPONSES TO PHQ QUESTIONS 1-9: 0
1. LITTLE INTEREST OR PLEASURE IN DOING THINGS: 0
2. FEELING DOWN, DEPRESSED OR HOPELESS: 0
SUM OF ALL RESPONSES TO PHQ9 QUESTIONS 1 & 2: 0

## 2023-03-01 ASSESSMENT — ENCOUNTER SYMPTOMS
EYE ITCHING: 0
EYE PAIN: 0
NAUSEA: 0
ABDOMINAL PAIN: 0
DIARRHEA: 0
COUGH: 0
SHORTNESS OF BREATH: 0
SINUS PAIN: 0
BACK PAIN: 0
EYE DISCHARGE: 0
COLOR CHANGE: 0
CONSTIPATION: 0
APNEA: 0
ABDOMINAL DISTENTION: 0
EYE REDNESS: 0

## 2023-03-01 NOTE — TELEPHONE ENCOUNTER
----- Message from CHELLE Sullivan CNP sent at 3/1/2023  2:23 PM EST -----  Regarding: Lab review  Please let Arron Rose know that I reviewed her labs and everything looks great. She does have a mild elevation in her LDL but she is still low risk overall for heart disease; continue lifestyle management. Obtain routine labs annually.

## 2023-03-01 NOTE — TELEPHONE ENCOUNTER
Called and advised patient per Vanessa Pass,  labs and everything looks great. She does have a mild elevation in her LDL but she is still low risk overall for heart disease; continue lifestyle management. Obtain routine labs annually.

## 2023-03-01 NOTE — PROGRESS NOTES
[unfilled]  [address] [place] [id number]      PROGRESS NOTE    SUBJECTIVE:   Jose Gates is a 43 y.o. female seen for a follow up visit regarding the following chief complaint:     Chief Complaint   Patient presents with    New Patient     Pt is here to establish care and is requesting a physical exam and lab work for her anemia. HPI    Patient presents as NP for PE. Past history of iron deficient anemia and takes iron for this with last Hgb 13.2;   She has occasional heart flutter that takes her breath away at times. This occurs very random, maybe once every few months and lasts for a few seconds. Recent hx of D&C due to irregular menses; she did have a benign polyp. She was referred to hematology/oncology for biopsy and planning to wait to assess further bleeding. She has continued the bleeding episodes and contemplating hysterectomy. Planning labs at work. Vision screen UTD and wearing glasses for correction; Works as a  in [address] [place] [address];     Current Outpatient Medications   Medication Sig Dispense Refill    VITAMIN D PO Take by mouth nightly      APPLE CIDER VINEGAR PO Take by mouth as needed      Ascorbic Acid (VITAMIN C) 250 MG tablet Take 250 mg by mouth nightly      Probiotic Product (PROBIOTIC-10 PO) Take by mouth nightly      Ferrous Sulfate (SLOW FE PO) Take by mouth nightly      Multiple Vitamins-Minerals (MULTIVITAL PO) Take by mouth nightly      ondansetron (ZOFRAN) 4 MG tablet Take 1 tablet by mouth 3 times daily as needed for Nausea or Vomiting 15 tablet 0    ibuprofen (ADVIL;MOTRIN) 800 MG tablet Take 1 tablet by mouth 3 times daily as needed for Pain 10 tablet 0     No current facility-administered medications for this visit.      No Known Allergies    Past Medical History:   Diagnosis Date    Anemia     no recent transfusions    Anesthesia complication     pt states she is slow to wake up    History of gestational diabetes     +    Menometrorrhagia     PCOS (polycystic ovarian syndrome) 2013     Past Surgical History:   Procedure Laterality Date     SECTION  ; ; 2015    x 3    DILATION AND CURETTAGE OF UTERUS N/A 2022    DILATATION AND CURETTAGE HYSTEROSCOPY performed by Roselyn Otto MD at [facility] MAIN OR     Family History   Problem Relation Age of Onset    Heart Disease Mother         CHF    Colon Cancer Paternal Grandmother     Heart Disease Maternal Grandmother     Lung Disease Father     Other Father         sarcoidosis    Heart Disease Maternal Grandfather     Cancer Mother 76        Bone    High Cholesterol Mother      Social History     Tobacco Use    Smoking status: Never    Smokeless tobacco: Never   Substance Use Topics    Alcohol use: Not Currently       Review of Systems   Constitutional:  Negative for activity change, appetite change, chills, fatigue and fever. HENT:  Negative for congestion, ear pain and sinus pain. Eyes:  Negative for pain, discharge, redness and itching. Respiratory:  Negative for apnea, cough and shortness of breath. Cardiovascular:  Negative for chest pain, palpitations and leg swelling. Gastrointestinal:  Negative for abdominal distention, abdominal pain, constipation, diarrhea and nausea. Endocrine: Negative for cold intolerance and heat intolerance. Genitourinary:  Negative for difficulty urinating, dysuria, enuresis and urgency. Musculoskeletal:  Negative for arthralgias, back pain, joint swelling, myalgias and neck pain. Skin:  Negative for color change and rash. Neurological:  Negative for dizziness, weakness and headaches. Psychiatric/Behavioral:  Negative for behavioral problems and sleep disturbance. The patient is not nervous/anxious.         OBJECTIVE:  /80 (Site: Left Upper Arm, Position: Sitting, Cuff Size: Small Adult)   Pulse 76   Temp 97.7 °F (36.5 °C) (Temporal)   Ht 5' 5\" (1.651 m)   Wt 149 lb (67.6 kg)   LMP 01/02/2023   SpO2 98%   BMI 24.79 kg/m²      Physical Exam  Constitutional:       General: She is not in acute distress. Appearance: Normal appearance. She is not ill-appearing. HENT:      Head: Normocephalic. Right Ear: Tympanic membrane normal.      Left Ear: Tympanic membrane normal.      Nose: No congestion. Mouth/Throat:      Mouth: Mucous membranes are moist.      Pharynx: Oropharynx is clear. No posterior oropharyngeal erythema. Eyes:      Conjunctiva/sclera: Conjunctivae normal.      Pupils: Pupils are equal, round, and reactive to light. Cardiovascular:      Rate and Rhythm: Normal rate and regular rhythm. Heart sounds: No murmur heard. Pulmonary:      Effort: Pulmonary effort is normal. No respiratory distress. Breath sounds: Normal breath sounds. Abdominal:      General: Abdomen is flat. Palpations: Abdomen is soft. Tenderness: There is no abdominal tenderness. Musculoskeletal:         General: Normal range of motion. Cervical back: Normal range of motion. Skin:     General: Skin is warm and dry. Findings: No rash. Neurological:      General: No focal deficit present. Mental Status: She is alert and oriented to person, place, and time. Mental status is at baseline. Psychiatric:         Mood and Affect: Mood normal.         Behavior: Behavior normal.         Thought Content: Thought content normal.         ASSESSMENT and Thai Cortez was seen today for new patient. Diagnoses and all orders for this visit:    Physical exam    Need for hepatitis C screening test  -     Hepatitis C Antibody; Future    Follow with gynecology/oncology routinely. Bring lab records to office to review. Annual Exam: Goals for good health at today's visit:  -Reach and stay at a healthy weight. This can lower your risk of obesity, diabetes, heart disease and high blood pressure. -Get at least 30 minutes of physical activity daily.   Walking, running, swimming, cycling or playing sports are good options.  -Do not smoke or allow others to smoke around you. -Use birth control if you do not want to have children at this time and barrier contraception to prevent or decrease risk of exposure to sexually transmitted diseases. -Use sunscreen daily, SPF of 15 or higher are best.  -See a dentist at least once yearly for checkups. -Have vision checked every 1-2 years.  -Wear a seat belt in the car.  -Avoid drinking in excess of 2 alcoholic drinks per day for men and 1 drink a day for women, or avoid drinking altogether.   -Watch closely for changes in our health and contact your doctor with any concerning problems or symptoms  -Age appropriate screening tests were updated and discussed at today's visit. -Immunization history was reviewed and updated at today's visit. Follow-up and Dispositions    Return in about 1 year (around 3/1/2024) for PE.          Vanessa Rinaldi, CHELLE - CNP

## 2023-10-11 RX ORDER — FLUCONAZOLE 150 MG/1
150 TABLET ORAL DAILY
Qty: 3 TABLET | Refills: 0 | Status: SHIPPED | OUTPATIENT
Start: 2023-10-11 | End: 2023-10-14

## 2023-10-16 NOTE — PROGRESS NOTES
FRANCISCA Benoit is a 37 y.o. female seen for annual GYN exam.  She is having bleeding at least 2 weeks out of each month. She will start off with spotting for several days then have a very heavy period for a full week and then have several days of spotting again. She has tried bcps, IUD and had D&C in Dec which did not help. She had US which showed adenomyosis. She is anemic and is taking iron. She was treated recently for yeast infection. Her symptoms have cleared up but she still has discharge. Past Medical History, Past Surgical History, Family history, Social History, and Medications were all reviewed with the patient today and updated as necessary. Current Outpatient Medications   Medication Sig    VITAMIN D PO Take by mouth nightly    APPLE CIDER VINEGAR PO Take by mouth as needed    Ascorbic Acid (VITAMIN C) 250 MG tablet Take 1 tablet by mouth nightly    Probiotic Product (PROBIOTIC-10 PO) Take by mouth nightly    Ferrous Sulfate (SLOW FE PO) Take by mouth nightly    Multiple Vitamins-Minerals (MULTIVITAL PO) Take by mouth nightly     No current facility-administered medications for this visit.      No Known Allergies  Past Medical History:   Diagnosis Date    Adenomyosis     Anemia     no recent transfusions    Anesthesia complication     pt states she is slow to wake up    Gestational diabetes mellitus     History of gestational diabetes     +    Menometrorrhagia     PCOS (polycystic ovarian syndrome) 2013     Past Surgical History:   Procedure Laterality Date     SECTION  ; ; 2015    x 3    DILATION AND CURETTAGE OF UTERUS N/A 2022    DILATATION AND CURETTAGE HYSTEROSCOPY performed by Barbie Baer MD at [facility] MAIN OR     Family History   Problem Relation Age of Onset    Heart Disease Mother         CHF    Cancer Mother 76        Bone    High Cholesterol Mother     Colon Cancer Paternal Grandmother     Heart Disease Maternal Grandmother     Lung Disease

## 2023-10-18 ENCOUNTER — OFFICE VISIT (OUTPATIENT)
Dept: OBGYN CLINIC | Age: 43
End: 2023-10-18
Payer: COMMERCIAL

## 2023-10-18 VITALS
WEIGHT: 146 LBS | HEIGHT: 66 IN | BODY MASS INDEX: 23.46 KG/M2 | DIASTOLIC BLOOD PRESSURE: 80 MMHG | SYSTOLIC BLOOD PRESSURE: 122 MMHG

## 2023-10-18 DIAGNOSIS — Z12.4 SCREENING FOR MALIGNANT NEOPLASM OF CERVIX: ICD-10-CM

## 2023-10-18 DIAGNOSIS — N80.03 ADENOMYOSIS: ICD-10-CM

## 2023-10-18 DIAGNOSIS — D50.0 IRON DEFICIENCY ANEMIA DUE TO CHRONIC BLOOD LOSS: ICD-10-CM

## 2023-10-18 DIAGNOSIS — Z12.31 VISIT FOR SCREENING MAMMOGRAM: ICD-10-CM

## 2023-10-18 DIAGNOSIS — N92.0 MENORRHAGIA WITH REGULAR CYCLE: ICD-10-CM

## 2023-10-18 DIAGNOSIS — Z01.419 WELL WOMAN EXAM: Primary | ICD-10-CM

## 2023-10-18 DIAGNOSIS — N89.8 VAGINAL ITCHING: ICD-10-CM

## 2023-10-18 LAB — WET PREP (POC): NORMAL

## 2023-10-18 PROCEDURE — 99396 PREV VISIT EST AGE 40-64: CPT | Performed by: OBSTETRICS & GYNECOLOGY

## 2023-10-18 PROCEDURE — 87210 SMEAR WET MOUNT SALINE/INK: CPT | Performed by: OBSTETRICS & GYNECOLOGY

## 2023-11-07 ENCOUNTER — HOSPITAL ENCOUNTER (OUTPATIENT)
Dept: MAMMOGRAPHY | Age: 43
Discharge: HOME | End: 2023-11-10

## 2023-11-07 DIAGNOSIS — Z12.31 VISIT FOR SCREENING MAMMOGRAM: ICD-10-CM

## 2023-12-27 ENCOUNTER — TELEPHONE (OUTPATIENT)
Dept: OBGYN CLINIC | Age: 43
End: 2023-12-27

## 2023-12-27 NOTE — TELEPHONE ENCOUNTER
Mayo Moreno message and she will send surgery order in for patient to be contacted for surgery consult visit.

## 2024-01-03 ENCOUNTER — TELEPHONE (OUTPATIENT)
Dept: OBGYN CLINIC | Age: 44
End: 2024-01-03

## 2024-02-28 ENCOUNTER — OFFICE VISIT (OUTPATIENT)
Dept: OBGYN CLINIC | Age: 44
End: 2024-02-28
Payer: COMMERCIAL

## 2024-02-28 VITALS
HEIGHT: 65 IN | WEIGHT: 151 LBS | BODY MASS INDEX: 25.16 KG/M2 | SYSTOLIC BLOOD PRESSURE: 103 MMHG | DIASTOLIC BLOOD PRESSURE: 69 MMHG

## 2024-02-28 DIAGNOSIS — N94.6 DYSMENORRHEA: Primary | ICD-10-CM

## 2024-02-28 DIAGNOSIS — N92.1 MENOMETRORRHAGIA: ICD-10-CM

## 2024-02-28 DIAGNOSIS — N80.03 ADENOMYOSIS: ICD-10-CM

## 2024-02-28 PROCEDURE — 99204 OFFICE O/P NEW MOD 45 MIN: CPT | Performed by: OBSTETRICS & GYNECOLOGY

## 2024-02-28 NOTE — PROGRESS NOTES
The patient is a 43 y.o. with 3 prior  sections who is seen for referral due to long menses. States had a Dilation and Curettage  by José Miguel Cao with benign findings. States advised and notified by previous Gynecology provider that Endometrial lining has grown into the Uterine wall; Patient states having a Dx of Adenomyosis, and PCOS. Requests  to discuss Partial Hysterectomy and ongoing symptoms. States menses lasts for 2 weeks 1-3 of heavy bleeding, then having a brown discharge the following 7 days.   Patient counseled face to face and records reviewed regarding her complaints, differential diagnosis and disposition with greater than 50% of the time spent in this way.  She is a good candidate for TLH/BSX due to adenomyosis.  She is counseled regarding the challenges with respect to scarring due to prior  sections but desires to avoid abdominal approach if possible.  She is counseled regarding risks of surgery including but not limited to hemorrhage during or after surgery which may result in further surgeries, blood transfusions or death; infection possibly requiring readmission to the hospital and or prolonged antibiotics use or further surgeries to correct; injury to adjacent abdominal structures, bladder or kidney tubes which may require another readmission and or more extensive surgeries to correct as well as remote possibility of surgery not correcting her problem.  She voices understanding these and other risks as described to her and a willingness to proceed.

## 2024-05-15 NOTE — PROGRESS NOTES
Enhanced Recovery After GYN Surgery: non-diabetic patients    It is highly recommended you purchase and drink Ensure Complete - one bottle twice daily for five days starting on 06/26/24. Ensure Complete is the preferred formula over other Ensure formulas. It is recommended that you continue drinking this for one month after surgery.    The night before surgery 07/01/24, drink 2 bottles of the Ensure Pre-Surgery drink.     The morning of surgery 07/02/24, drink one bottle of the Ensure Pre-Surgery drink 2 hours prior to your arrival to the hospital. Drink this over 5-10 minutes.    Drink nothing else after drinking the pre-surgical drink the morning of surgery.    Bring your patient handbook with you to the hospital.    Things to remember:    1. You will be given clear liquids to drink, advancing diet as tolerated    2. You will be up and moving around with assistance 2-4 hours after surgery.    3. You will be given regularly scheduled pain medications (NSAIDS, Tylenol) with narcotics as needed.    4. You may be able to go home that night if the surgeon okays and you are up and eating and drinking. Otherwise, your discharge will be the following morning around lunch time.     5. Continue drinking Ensure Complete for 5 days after surgery.

## 2024-06-03 NOTE — PROGRESS NOTES
Preop Visit    Ms.Bridget KERMIT Ty presents for a preop visit.  She is scheduled for a  ERAS/ HYSTERECTOMY ABDOMINAL LAPAROSCOPIC/ BILATERAL SALPINGECTOMY . .  Her history, meds, and allergies were reviewed.  The procedure was reviewed in detail as well as the risks of bleeding, infection, DVT and potential surgical complications involving the bladder, ureters, colon or intestines.  Also the alternatives,  benefits, recovery and follow-up. Prevention of SSI discussed as indicated.  All of her questions were answered.    Exam:  Lungs CTAB  Heart RRR    See the hospital H&P as well as prior chart for details.

## 2024-06-04 ENCOUNTER — OFFICE VISIT (OUTPATIENT)
Dept: OBGYN CLINIC | Age: 44
End: 2024-06-04

## 2024-06-04 VITALS — BODY MASS INDEX: 25.52 KG/M2 | HEIGHT: 65 IN | WEIGHT: 153.2 LBS

## 2024-06-04 DIAGNOSIS — Z01.818 PREOP EXAMINATION: Primary | ICD-10-CM

## 2024-06-04 PROCEDURE — 99024 POSTOP FOLLOW-UP VISIT: CPT | Performed by: OBSTETRICS & GYNECOLOGY

## 2024-06-12 ENCOUNTER — HOSPITAL ENCOUNTER (OUTPATIENT)
Dept: SURGERY | Age: 44
Discharge: HOME | End: 2024-06-15
Payer: COMMERCIAL

## 2024-06-12 DIAGNOSIS — Z01.818 PRE-OP TESTING: ICD-10-CM

## 2024-06-12 LAB — HGB BLD-MCNC: 10.4 G/DL (ref 11.7–15.4)

## 2024-06-12 PROCEDURE — 85018 HEMOGLOBIN: CPT

## 2024-06-12 PROCEDURE — 36415 COLL VENOUS BLD VENIPUNCTURE: CPT

## 2024-06-26 NOTE — PERIOP NOTE
Patient verified name and     Order for consent NOT found in EHR; patient verified.     Type 2 surgery    Labs per surgeon: No orders received.  Labs per anesthesia protocol: Hgb 10.4 on 24; results within anesthesia guidelines.   T&S DOS; order signed and held in EHR.   EKG: None per anesthesia protocol.       Patient attended GYN class on 24. Patient received all patient education and received hospital approved surgical skin cleanser.    Patient instructed to hold all vitamins 7 days prior to surgery and NSAIDS 5 days prior to surgery, patient verbalized understanding.    Due to anemia, pt instructed to continue iron supplement up until the DOS.     Patient instructed to continue previous medications as prescribed prior to surgery and to take the following medications the day of surgery according to anesthesia guidelines with a small sip of water: NONE.     Patient answered medical/surgical history questions at their best of ability. All prior to admission medications documented in [facility].

## 2024-06-28 ENCOUNTER — TELEPHONE (OUTPATIENT)
Dept: OBGYN CLINIC | Age: 44
End: 2024-06-28

## 2024-06-28 NOTE — TELEPHONE ENCOUNTER
Spoke with pt regarding provider change for upcoming surgery. Pt is okay with surgery being performed by another physician, she will speak with her  to confirm, left my contact info with her if she has any questions

## 2024-07-01 ENCOUNTER — ANESTHESIA EVENT (OUTPATIENT)
Dept: SURGERY | Age: 44
End: 2024-07-01
Payer: COMMERCIAL

## 2024-07-02 ENCOUNTER — HOSPITAL ENCOUNTER (OUTPATIENT)
Age: 44
Discharge: HOME | End: 2024-07-02
Attending: OBSTETRICS & GYNECOLOGY | Admitting: OBSTETRICS & GYNECOLOGY
Payer: COMMERCIAL

## 2024-07-02 ENCOUNTER — ANESTHESIA (OUTPATIENT)
Dept: SURGERY | Age: 44
End: 2024-07-02
Payer: COMMERCIAL

## 2024-07-02 ENCOUNTER — PREP FOR PROCEDURE (OUTPATIENT)
Dept: OBGYN CLINIC | Age: 44
End: 2024-07-02

## 2024-07-02 VITALS
DIASTOLIC BLOOD PRESSURE: 68 MMHG | OXYGEN SATURATION: 98 % | WEIGHT: 151.9 LBS | SYSTOLIC BLOOD PRESSURE: 128 MMHG | HEART RATE: 82 BPM | HEIGHT: 65 IN | RESPIRATION RATE: 14 BRPM | TEMPERATURE: 98.1 F | BODY MASS INDEX: 25.31 KG/M2

## 2024-07-02 DIAGNOSIS — N80.03 ABNORMAL UTERINE BLEEDING DUE TO ADENOMYOSIS: Primary | ICD-10-CM

## 2024-07-02 DIAGNOSIS — N80.03 ADENOMYOSIS: ICD-10-CM

## 2024-07-02 DIAGNOSIS — Z01.818 PRE-OP TESTING: ICD-10-CM

## 2024-07-02 DIAGNOSIS — N93.9 ABNORMAL UTERINE BLEEDING DUE TO ADENOMYOSIS: Primary | ICD-10-CM

## 2024-07-02 PROBLEM — N92.1 MENOMETRORRHAGIA: Status: RESOLVED | Noted: 2022-10-17 | Resolved: 2024-07-02

## 2024-07-02 LAB
ABO + RH BLD: NORMAL
BLOOD GROUP ANTIBODIES SERPL: NORMAL
HCG UR QL: NEGATIVE
SPECIMEN EXP DATE BLD: NORMAL

## 2024-07-02 PROCEDURE — 2580000003 HC RX 258: Performed by: OBSTETRICS & GYNECOLOGY

## 2024-07-02 PROCEDURE — 2580000003 HC RX 258: Performed by: ANESTHESIOLOGY

## 2024-07-02 PROCEDURE — 3600000004 HC SURGERY LEVEL 4 BASE: Performed by: OBSTETRICS & GYNECOLOGY

## 2024-07-02 PROCEDURE — 58571 TLH W/T/O 250 G OR LESS: CPT | Performed by: OBSTETRICS & GYNECOLOGY

## 2024-07-02 PROCEDURE — 7100000000 HC PACU RECOVERY - FIRST 15 MIN: Performed by: OBSTETRICS & GYNECOLOGY

## 2024-07-02 PROCEDURE — 7100000010 HC PHASE II RECOVERY - FIRST 15 MIN: Performed by: OBSTETRICS & GYNECOLOGY

## 2024-07-02 PROCEDURE — 7100000011 HC PHASE II RECOVERY - ADDTL 15 MIN: Performed by: OBSTETRICS & GYNECOLOGY

## 2024-07-02 PROCEDURE — 6360000002 HC RX W HCPCS: Performed by: ANESTHESIOLOGY

## 2024-07-02 PROCEDURE — 86900 BLOOD TYPING SEROLOGIC ABO: CPT

## 2024-07-02 PROCEDURE — 3700000001 HC ADD 15 MINUTES (ANESTHESIA): Performed by: OBSTETRICS & GYNECOLOGY

## 2024-07-02 PROCEDURE — 3600000014 HC SURGERY LEVEL 4 ADDTL 15MIN: Performed by: OBSTETRICS & GYNECOLOGY

## 2024-07-02 PROCEDURE — 81025 URINE PREGNANCY TEST: CPT

## 2024-07-02 PROCEDURE — 7100000001 HC PACU RECOVERY - ADDTL 15 MIN: Performed by: OBSTETRICS & GYNECOLOGY

## 2024-07-02 PROCEDURE — 6360000002 HC RX W HCPCS: Performed by: OBSTETRICS & GYNECOLOGY

## 2024-07-02 PROCEDURE — 2720000010 HC SURG SUPPLY STERILE: Performed by: OBSTETRICS & GYNECOLOGY

## 2024-07-02 PROCEDURE — 2709999900 HC NON-CHARGEABLE SUPPLY: Performed by: OBSTETRICS & GYNECOLOGY

## 2024-07-02 PROCEDURE — 6370000000 HC RX 637 (ALT 250 FOR IP): Performed by: ANESTHESIOLOGY

## 2024-07-02 PROCEDURE — 88307 TISSUE EXAM BY PATHOLOGIST: CPT

## 2024-07-02 PROCEDURE — 6360000002 HC RX W HCPCS: Performed by: NURSE ANESTHETIST, CERTIFIED REGISTERED

## 2024-07-02 PROCEDURE — 2500000003 HC RX 250 WO HCPCS: Performed by: NURSE ANESTHETIST, CERTIFIED REGISTERED

## 2024-07-02 PROCEDURE — 2580000003 HC RX 258: Performed by: NURSE ANESTHETIST, CERTIFIED REGISTERED

## 2024-07-02 PROCEDURE — 3700000000 HC ANESTHESIA ATTENDED CARE: Performed by: OBSTETRICS & GYNECOLOGY

## 2024-07-02 PROCEDURE — 86901 BLOOD TYPING SEROLOGIC RH(D): CPT

## 2024-07-02 PROCEDURE — 58571 TLH W/T/O 250 G OR LESS: CPT | Performed by: STUDENT IN AN ORGANIZED HEALTH CARE EDUCATION/TRAINING PROGRAM

## 2024-07-02 PROCEDURE — 86850 RBC ANTIBODY SCREEN: CPT

## 2024-07-02 RX ORDER — SODIUM CHLORIDE 9 MG/ML
INJECTION, SOLUTION INTRAVENOUS PRN
Status: DISCONTINUED | OUTPATIENT
Start: 2024-07-02 | End: 2024-07-02 | Stop reason: HOSPADM

## 2024-07-02 RX ORDER — CEFOXITIN 1 G/1
INJECTION, POWDER, FOR SOLUTION INTRAVENOUS PRN
Status: DISCONTINUED | OUTPATIENT
Start: 2024-07-02 | End: 2024-07-02

## 2024-07-02 RX ORDER — MIDAZOLAM HYDROCHLORIDE 2 MG/2ML
2 INJECTION, SOLUTION INTRAMUSCULAR; INTRAVENOUS
Status: DISCONTINUED | OUTPATIENT
Start: 2024-07-02 | End: 2024-07-02 | Stop reason: HOSPADM

## 2024-07-02 RX ORDER — LIDOCAINE HYDROCHLORIDE 20 MG/ML
INJECTION, SOLUTION EPIDURAL; INFILTRATION; INTRACAUDAL; PERINEURAL PRN
Status: DISCONTINUED | OUTPATIENT
Start: 2024-07-02 | End: 2024-07-02 | Stop reason: SDUPTHER

## 2024-07-02 RX ORDER — ONDANSETRON 4 MG/1
4 TABLET, ORALLY DISINTEGRATING ORAL EVERY 8 HOURS PRN
Status: CANCELLED | OUTPATIENT
Start: 2024-07-02

## 2024-07-02 RX ORDER — SODIUM CHLORIDE 0.9 % (FLUSH) 0.9 %
5-40 SYRINGE (ML) INJECTION PRN
Status: CANCELLED | OUTPATIENT
Start: 2024-07-02

## 2024-07-02 RX ORDER — KETOROLAC TROMETHAMINE 30 MG/ML
INJECTION, SOLUTION INTRAMUSCULAR; INTRAVENOUS PRN
Status: DISCONTINUED | OUTPATIENT
Start: 2024-07-02 | End: 2024-07-02 | Stop reason: SDUPTHER

## 2024-07-02 RX ORDER — NEOSTIGMINE METHYLSULFATE 1 MG/ML
INJECTION, SOLUTION INTRAVENOUS PRN
Status: DISCONTINUED | OUTPATIENT
Start: 2024-07-02 | End: 2024-07-02 | Stop reason: SDUPTHER

## 2024-07-02 RX ORDER — SODIUM CHLORIDE 0.9 % (FLUSH) 0.9 %
5-40 SYRINGE (ML) INJECTION PRN
Status: DISCONTINUED | OUTPATIENT
Start: 2024-07-02 | End: 2024-07-02 | Stop reason: HOSPADM

## 2024-07-02 RX ORDER — SODIUM CHLORIDE 0.9 % (FLUSH) 0.9 %
5-40 SYRINGE (ML) INJECTION EVERY 12 HOURS SCHEDULED
Status: DISCONTINUED | OUTPATIENT
Start: 2024-07-02 | End: 2024-07-02 | Stop reason: HOSPADM

## 2024-07-02 RX ORDER — SODIUM CHLORIDE, SODIUM LACTATE, POTASSIUM CHLORIDE, CALCIUM CHLORIDE 600; 310; 30; 20 MG/100ML; MG/100ML; MG/100ML; MG/100ML
INJECTION, SOLUTION INTRAVENOUS CONTINUOUS
Status: CANCELLED | OUTPATIENT
Start: 2024-07-02

## 2024-07-02 RX ORDER — GLYCOPYRROLATE 0.2 MG/ML
INJECTION INTRAMUSCULAR; INTRAVENOUS PRN
Status: DISCONTINUED | OUTPATIENT
Start: 2024-07-02 | End: 2024-07-02 | Stop reason: SDUPTHER

## 2024-07-02 RX ORDER — PROPOFOL 10 MG/ML
INJECTION, EMULSION INTRAVENOUS PRN
Status: DISCONTINUED | OUTPATIENT
Start: 2024-07-02 | End: 2024-07-02 | Stop reason: SDUPTHER

## 2024-07-02 RX ORDER — MIDAZOLAM HYDROCHLORIDE 1 MG/ML
INJECTION INTRAMUSCULAR; INTRAVENOUS PRN
Status: DISCONTINUED | OUTPATIENT
Start: 2024-07-02 | End: 2024-07-02 | Stop reason: SDUPTHER

## 2024-07-02 RX ORDER — ACETAMINOPHEN 500 MG
1000 TABLET ORAL EVERY 8 HOURS SCHEDULED
Status: CANCELLED | OUTPATIENT
Start: 2024-07-02

## 2024-07-02 RX ORDER — DEXAMETHASONE SODIUM PHOSPHATE 10 MG/ML
INJECTION INTRAMUSCULAR; INTRAVENOUS PRN
Status: DISCONTINUED | OUTPATIENT
Start: 2024-07-02 | End: 2024-07-02 | Stop reason: SDUPTHER

## 2024-07-02 RX ORDER — SODIUM CHLORIDE 0.9 % (FLUSH) 0.9 %
5-40 SYRINGE (ML) INJECTION EVERY 12 HOURS SCHEDULED
Status: CANCELLED | OUTPATIENT
Start: 2024-07-02

## 2024-07-02 RX ORDER — FENTANYL CITRATE 50 UG/ML
INJECTION, SOLUTION INTRAMUSCULAR; INTRAVENOUS PRN
Status: DISCONTINUED | OUTPATIENT
Start: 2024-07-02 | End: 2024-07-02 | Stop reason: SDUPTHER

## 2024-07-02 RX ORDER — MORPHINE SULFATE 4 MG/ML
4 INJECTION, SOLUTION INTRAMUSCULAR; INTRAVENOUS
Status: CANCELLED | OUTPATIENT
Start: 2024-07-02

## 2024-07-02 RX ORDER — IBUPROFEN 800 MG/1
800 TABLET ORAL EVERY 8 HOURS
Status: CANCELLED | OUTPATIENT
Start: 2024-07-03

## 2024-07-02 RX ORDER — OXYCODONE HYDROCHLORIDE 5 MG/1
10 TABLET ORAL PRN
Status: DISCONTINUED | OUTPATIENT
Start: 2024-07-02 | End: 2024-07-02 | Stop reason: HOSPADM

## 2024-07-02 RX ORDER — ACETAMINOPHEN 500 MG
1000 TABLET ORAL ONCE
Status: COMPLETED | OUTPATIENT
Start: 2024-07-02 | End: 2024-07-02

## 2024-07-02 RX ORDER — OXYCODONE HYDROCHLORIDE 5 MG/1
5 TABLET ORAL PRN
Status: DISCONTINUED | OUTPATIENT
Start: 2024-07-02 | End: 2024-07-02 | Stop reason: HOSPADM

## 2024-07-02 RX ORDER — PROCHLORPERAZINE EDISYLATE 5 MG/ML
5 INJECTION INTRAMUSCULAR; INTRAVENOUS
Status: DISCONTINUED | OUTPATIENT
Start: 2024-07-02 | End: 2024-07-02 | Stop reason: HOSPADM

## 2024-07-02 RX ORDER — OXYCODONE HYDROCHLORIDE AND ACETAMINOPHEN 5; 325 MG/1; MG/1
1 TABLET ORAL EVERY 6 HOURS PRN
Qty: 15 TABLET | Refills: 0 | Status: SHIPPED | OUTPATIENT
Start: 2024-07-02 | End: 2024-07-07

## 2024-07-02 RX ORDER — LIDOCAINE HYDROCHLORIDE ANHYDROUS AND DEXTROSE MONOHYDRATE 5; 400 G/100ML; MG/100ML
INJECTION, SOLUTION INTRAVENOUS CONTINUOUS PRN
Status: DISCONTINUED | OUTPATIENT
Start: 2024-07-02 | End: 2024-07-02 | Stop reason: SDUPTHER

## 2024-07-02 RX ORDER — NALOXONE HYDROCHLORIDE 0.4 MG/ML
INJECTION, SOLUTION INTRAMUSCULAR; INTRAVENOUS; SUBCUTANEOUS PRN
Status: DISCONTINUED | OUTPATIENT
Start: 2024-07-02 | End: 2024-07-02 | Stop reason: HOSPADM

## 2024-07-02 RX ORDER — OXYCODONE HYDROCHLORIDE 5 MG/1
10 TABLET ORAL EVERY 4 HOURS PRN
Status: CANCELLED | OUTPATIENT
Start: 2024-07-02

## 2024-07-02 RX ORDER — ONDANSETRON 2 MG/ML
4 INJECTION INTRAMUSCULAR; INTRAVENOUS
Status: DISCONTINUED | OUTPATIENT
Start: 2024-07-02 | End: 2024-07-02 | Stop reason: HOSPADM

## 2024-07-02 RX ORDER — ROCURONIUM BROMIDE 10 MG/ML
INJECTION, SOLUTION INTRAVENOUS PRN
Status: DISCONTINUED | OUTPATIENT
Start: 2024-07-02 | End: 2024-07-02 | Stop reason: SDUPTHER

## 2024-07-02 RX ORDER — KETAMINE HCL IN NACL, ISO-OSM 20 MG/2 ML
SYRINGE (ML) INJECTION PRN
Status: DISCONTINUED | OUTPATIENT
Start: 2024-07-02 | End: 2024-07-02 | Stop reason: SDUPTHER

## 2024-07-02 RX ORDER — SODIUM CHLORIDE, SODIUM LACTATE, POTASSIUM CHLORIDE, CALCIUM CHLORIDE 600; 310; 30; 20 MG/100ML; MG/100ML; MG/100ML; MG/100ML
INJECTION, SOLUTION INTRAVENOUS CONTINUOUS
Status: DISCONTINUED | OUTPATIENT
Start: 2024-07-02 | End: 2024-07-02 | Stop reason: HOSPADM

## 2024-07-02 RX ORDER — SODIUM CHLORIDE 9 MG/ML
INJECTION, SOLUTION INTRAVENOUS PRN
Status: CANCELLED | OUTPATIENT
Start: 2024-07-02

## 2024-07-02 RX ORDER — OXYCODONE HYDROCHLORIDE 5 MG/1
5 TABLET ORAL EVERY 4 HOURS PRN
Status: CANCELLED | OUTPATIENT
Start: 2024-07-02

## 2024-07-02 RX ORDER — KETOROLAC TROMETHAMINE 30 MG/ML
30 INJECTION, SOLUTION INTRAMUSCULAR; INTRAVENOUS EVERY 6 HOURS
Status: CANCELLED | OUTPATIENT
Start: 2024-07-02 | End: 2024-07-03

## 2024-07-02 RX ORDER — SCOLOPAMINE TRANSDERMAL SYSTEM 1 MG/1
1 PATCH, EXTENDED RELEASE TRANSDERMAL
Status: DISCONTINUED | OUTPATIENT
Start: 2024-07-02 | End: 2024-07-02 | Stop reason: HOSPADM

## 2024-07-02 RX ORDER — ONDANSETRON 2 MG/ML
INJECTION INTRAMUSCULAR; INTRAVENOUS PRN
Status: DISCONTINUED | OUTPATIENT
Start: 2024-07-02 | End: 2024-07-02 | Stop reason: SDUPTHER

## 2024-07-02 RX ORDER — MORPHINE SULFATE 2 MG/ML
2 INJECTION, SOLUTION INTRAMUSCULAR; INTRAVENOUS
Status: CANCELLED | OUTPATIENT
Start: 2024-07-02

## 2024-07-02 RX ORDER — ONDANSETRON 2 MG/ML
4 INJECTION INTRAMUSCULAR; INTRAVENOUS EVERY 6 HOURS PRN
Status: CANCELLED | OUTPATIENT
Start: 2024-07-02

## 2024-07-02 RX ORDER — LIDOCAINE HYDROCHLORIDE 10 MG/ML
1 INJECTION, SOLUTION INFILTRATION; PERINEURAL
Status: DISCONTINUED | OUTPATIENT
Start: 2024-07-02 | End: 2024-07-02 | Stop reason: HOSPADM

## 2024-07-02 RX ORDER — BUPIVACAINE HYDROCHLORIDE 5 MG/ML
INJECTION, SOLUTION EPIDURAL; INTRACAUDAL PRN
Status: DISCONTINUED | OUTPATIENT
Start: 2024-07-02 | End: 2024-07-02 | Stop reason: ALTCHOICE

## 2024-07-02 RX ADMIN — FENTANYL CITRATE 100 MCG: 50 INJECTION, SOLUTION INTRAMUSCULAR; INTRAVENOUS at 07:22

## 2024-07-02 RX ADMIN — HYDROMORPHONE HYDROCHLORIDE 0.25 MG: 1 INJECTION, SOLUTION INTRAMUSCULAR; INTRAVENOUS; SUBCUTANEOUS at 10:56

## 2024-07-02 RX ADMIN — ACETAMINOPHEN 1000 MG: 500 TABLET, FILM COATED ORAL at 06:18

## 2024-07-02 RX ADMIN — MIDAZOLAM 2 MG: 1 INJECTION INTRAMUSCULAR; INTRAVENOUS at 07:16

## 2024-07-02 RX ADMIN — PROPOFOL 200 MG: 10 INJECTION, EMULSION INTRAVENOUS at 07:22

## 2024-07-02 RX ADMIN — KETOROLAC TROMETHAMINE 30 MG: 30 INJECTION, SOLUTION INTRAMUSCULAR; INTRAVENOUS at 09:32

## 2024-07-02 RX ADMIN — CEFOXITIN 2000 MG: 2 INJECTION, POWDER, FOR SOLUTION INTRAVENOUS at 06:29

## 2024-07-02 RX ADMIN — Medication 20 MG: at 07:22

## 2024-07-02 RX ADMIN — HYDROMORPHONE HYDROCHLORIDE 0.25 MG: 1 INJECTION, SOLUTION INTRAMUSCULAR; INTRAVENOUS; SUBCUTANEOUS at 10:51

## 2024-07-02 RX ADMIN — LIDOCAINE HYDROCHLORIDE 1.5 MG/KG/HR: 4 INJECTION, SOLUTION INTRAVENOUS at 07:30

## 2024-07-02 RX ADMIN — SODIUM CHLORIDE 150 MG: 9 INJECTION, SOLUTION INTRAVENOUS at 06:21

## 2024-07-02 RX ADMIN — ROCURONIUM BROMIDE 40 MG: 10 INJECTION, SOLUTION INTRAVENOUS at 07:22

## 2024-07-02 RX ADMIN — ONDANSETRON 4 MG: 2 INJECTION INTRAMUSCULAR; INTRAVENOUS at 09:01

## 2024-07-02 RX ADMIN — Medication 3 MG: at 09:37

## 2024-07-02 RX ADMIN — SODIUM CHLORIDE, POTASSIUM CHLORIDE, SODIUM LACTATE AND CALCIUM CHLORIDE: 600; 310; 30; 20 INJECTION, SOLUTION INTRAVENOUS at 06:28

## 2024-07-02 RX ADMIN — HYDROMORPHONE HYDROCHLORIDE 0.25 MG: 1 INJECTION, SOLUTION INTRAMUSCULAR; INTRAVENOUS; SUBCUTANEOUS at 10:29

## 2024-07-02 RX ADMIN — LIDOCAINE HYDROCHLORIDE 100 MG: 20 INJECTION, SOLUTION EPIDURAL; INFILTRATION; INTRACAUDAL; PERINEURAL at 07:22

## 2024-07-02 RX ADMIN — HYDROMORPHONE HYDROCHLORIDE 0.25 MG: 1 INJECTION, SOLUTION INTRAMUSCULAR; INTRAVENOUS; SUBCUTANEOUS at 10:24

## 2024-07-02 RX ADMIN — Medication 5 MG: at 08:03

## 2024-07-02 RX ADMIN — CEFOXITIN 2000 MG: 2 INJECTION, POWDER, FOR SOLUTION INTRAVENOUS at 07:30

## 2024-07-02 RX ADMIN — DEXAMETHASONE SODIUM PHOSPHATE 10 MG: 10 INJECTION INTRAMUSCULAR; INTRAVENOUS at 07:30

## 2024-07-02 RX ADMIN — GLYCOPYRROLATE 0.4 MG: 0.2 INJECTION INTRAMUSCULAR; INTRAVENOUS at 09:37

## 2024-07-02 ASSESSMENT — PAIN DESCRIPTION - LOCATION
LOCATION: ABDOMEN

## 2024-07-02 ASSESSMENT — PAIN SCALES - GENERAL
PAINLEVEL_OUTOF10: 4
PAINLEVEL_OUTOF10: 5
PAINLEVEL_OUTOF10: 6
PAINLEVEL_OUTOF10: 3
PAINLEVEL_OUTOF10: 5
PAINLEVEL_OUTOF10: 5
PAINLEVEL_OUTOF10: 6
PAINLEVEL_OUTOF10: 4
PAINLEVEL_OUTOF10: 5
PAINLEVEL_OUTOF10: 4

## 2024-07-02 ASSESSMENT — PAIN - FUNCTIONAL ASSESSMENT: PAIN_FUNCTIONAL_ASSESSMENT: 0-10

## 2024-07-02 NOTE — ANESTHESIA POSTPROCEDURE EVALUATION
Department of Anesthesiology  Postprocedure Note    Patient: Mary Ty  MRN: [id number]  YOB: 1980  Date of evaluation: 7/2/2024    Procedure Summary       Date: 07/02/24 Room / Location: [facility] MAIN OR 03 / E MAIN OR    Anesthesia Start: 0717 Anesthesia Stop: 1004    Procedure: ERAS/ HYSTERECTOMY ABDOMINAL LAPAROSCOPIC/ BILATERAL SALPINGECTOMY (Abdomen) Diagnosis:       Adenomyosis      PCOS (polycystic ovarian syndrome)      (Adenomyosis [N80.03])      (PCOS (polycystic ovarian syndrome) [E28.2])    Surgeons: Guillermo Plunkett MD Responsible Provider: COLLINS Pope MD    Anesthesia Type: general ASA Status: 2            Anesthesia Type: No value filed.    Carissa Phase I: Carissa Score: 8    Carissa Phase II:      Anesthesia Post Evaluation    Patient location during evaluation: PACU  Patient participation: complete - patient participated  Level of consciousness: awake and alert  Airway patency: patent  Nausea & Vomiting: no nausea and no vomiting  Cardiovascular status: hemodynamically stable  Respiratory status: acceptable  Hydration status: euvolemic  Comments: Blood pressure 125/73, pulse 70, temperature 98.1 °F (36.7 °C), temperature source Skin, resp. rate 13, height 1.651 m (5' 5\"), weight 68.9 kg (151 lb 14.4 oz), SpO2 98 %.    No apparent anesthetic complications.  Pt stable for discharge from PACU  Multimodal analgesia pain management approach  Pain management: adequate    No notable events documented.

## 2024-07-02 NOTE — DISCHARGE SUMMARY
[facility] GYN Discharge Summary     Patient ID:  Mary Ty  [id number]  44 y.o.  1980    Admit date: 2024    Discharge date and time: 2024  3:47 PM     Admitting Physician: Guillermo Plunkett MD     Discharge Physician: Guillermo Plunkett M.D.    Admission Diagnoses: Adenomyosis [N80.03]  PCOS (polycystic ovarian syndrome) [E28.2]  Abnormal uterine bleeding (AUB) [N93.9]    Problem List: Hospital - Principal Problem (Resolved):    Adenomyosis  Active Problems:    PCOS (polycystic ovarian syndrome)  Resolved Problems:    Abnormal uterine bleeding (AUB)   ; Other -   Patient Active Problem List   Diagnosis    Dysmenorrhea    Trichomonas infection    History of  delivery    BV (bacterial vaginosis)    Tenderness of female pelvic organs    PCOS (polycystic ovarian syndrome)    Abnormal uterine bleeding due to adenomyosis        Discharge Diagnoses: Adenomyosis [N80.03]  PCOS (polycystic ovarian syndrome) [E28.2]  Abnormal uterine bleeding (AUB) [N93.9]    Hospital Course: Mary Ty had unremarkeable progressive recovery.  and Eating, ambulating, and voiding in a routine manner.    Significant Diagnostic Studies:   Recent Results (from the past 24 hour(s))   TYPE AND SCREEN    Collection Time: 24  6:33 AM   Result Value Ref Range    Crossmatch expiration date 2024,2359     ABO/Rh O POSITIVE     Antibody Screen NEG    POC Pregnancy Urine Qual    Collection Time: 24  6:39 AM   Result Value Ref Range    Preg Test, Ur Negative NEG         Procedures: Total Laparoscopic Hysterectomy with Bilateral Salpingectomy and cystoscopy    Discharge Exam:  /68   Pulse 82   Temp 98.1 °F (36.7 °C) (Temporal)   Resp 14   Ht 1.651 m (5' 5\")   Wt 68.9 kg (151 lb 14.4 oz)   SpO2 98%   BMI 25.28 kg/m²    Heart: regular rate and rhythm, S1, S2 normal, no murmur, click, rub or gallop  Lungs: clear to auscultation bilaterally  Extremities: normal, atraumatic, no cyanosis or

## 2024-07-02 NOTE — PERIOP NOTE
Patient to void 350cc at this time.     Patient ambulating without difficulty. Pt VSS stable. Pain and Nausea controlled at this time. Nurse to try to get in touch with MD Plunkett at this time with no answer and mailbox full-Nurse to discharge patient home per protocol. Plan of care continues.

## 2024-07-02 NOTE — ANESTHESIA PRE PROCEDURE
given: Not Answered      Vital Signs (Current):   Vitals:    06/26/24 0755 07/02/24 0556   BP:  121/73   Pulse:  62   Resp:  18   Temp:  97.7 °F (36.5 °C)   TempSrc:  Temporal   SpO2:  99%   Weight: 69.4 kg (153 lb) 68.9 kg (151 lb 14.4 oz)   Height: 1.651 m (5' 5\") 1.651 m (5' 5\")                                              BP Readings from Last 3 Encounters:   07/02/24 121/73   02/28/24 103/69   10/18/23 122/80       NPO Status: Time of last liquid consumption: 0315                        Time of last solid consumption: 2000                        Date of last liquid consumption: 07/02/24                        Date of last solid food consumption: 07/01/24    BMI:   Wt Readings from Last 3 Encounters:   07/02/24 68.9 kg (151 lb 14.4 oz)   06/04/24 69.5 kg (153 lb 3.2 oz)   02/28/24 68.5 kg (151 lb)     Body mass index is 25.28 kg/m².    CBC:   Lab Results   Component Value Date/Time    HGB 10.4 06/12/2024 12:02 PM    HCT 42.6 12/20/2022 11:06 AM       CMP: No results found for: \"NA\", \"K\", \"CL\", \"CO2\", \"BUN\", \"CREATININE\", \"GFRAA\", \"AGRATIO\", \"LABGLOM\", \"GLUCOSE\", \"GLU\", \"CALCIUM\", \"BILITOT\", \"ALKPHOS\", \"AST\", \"ALT\"    POC Tests: No results for input(s): \"POCGLU\", \"POCNA\", \"POCK\", \"POCCL\", \"POCBUN\", \"POCHEMO\", \"POCHCT\" in the last 72 hours.    Coags: No results found for: \"PROTIME\", \"INR\", \"APTT\"    HCG (If Applicable):   Lab Results   Component Value Date    PREGTESTUR Negative 12/20/2022        ABGs: No results found for: \"PHART\", \"PO2ART\", \"DKB1UFN\", \"RMR5KAB\", \"BEART\", \"V3YHVAVP\"     Type & Screen (If Applicable):  No results found for: \"LABABO\"    Drug/Infectious Status (If Applicable):  No results found for: \"HIV\", \"HEPCAB\"    COVID-19 Screening (If Applicable): No results found for: \"COVID19\"        Anesthesia Evaluation  Patient summary reviewed and Nursing notes reviewed  Airway: Mallampati: II  TM distance: >3 FB   Neck ROM: full  Mouth opening: > = 3 FB   Dental: normal exam         Pulmonary:Negative

## 2024-07-02 NOTE — OP NOTE
[facility] [address]  [address]                            OPERATIVE REPORT      PATIENT NAME: CHLOE CHAVARRIA        : 1980  MED REC NO: [id number]                       ROOM: [facility] NO: [id number]                       ADMIT DATE: 2024  PROVIDER: Guillermo Plunkett MD    DATE OF SERVICE:  2024    PREOPERATIVE DIAGNOSES:  Very heavy uterine bleeding with adenomyosis.    POSTOPERATIVE DIAGNOSES:  Very heavy uterine bleeding with adenomyosis.    PROCEDURES PERFORMED:       1. Total laparoscopic hysterectomy with bilateral salpingectomy.     2. Observational cystoscopy.    SURGEON:  Guillermo Plunkett MD    ASSISTANT:  Dr. Collette Kaye..    ANESTHESIA:  General.    ESTIMATED BLOOD LOSS:  50 to 75 mL.    SPECIMENS REMOVED:  Tissue, pathology specimen, uterus with cervix and bilateral fallopian tubes.    INTRAOPERATIVE FINDINGS:  Enlarged, boggy uterus with adenomyosis, fallopian tubes and ovaries were normal as well as upper abdomen.     COMPLICATIONS:  Severe bladder adhesions.    IMPLANTS:  None.    INDICATIONS:  AUB    DESCRIPTION OF PROCEDURE:  After informed consent, the patient was taken to the operating room and given general anesthesia.  She was prepped and draped in the usual sterile fashion in dorsal lithotomy position.  Time-out was accomplished.  Weighted speculum was placed in the vagina.  Tenaculum was used to grasp the anterior lip of the cervix.  The cervix was now dilated to admit the medium-sized VCare apparatus, this was placed through the cervix into the uterus and inflated as the tenaculum and weighted speculum were removed, this was ratcheted down to completely cover the cervix.  A Hopper catheter was inserted.    Laparoscopy was now begun with infiltrating the infraumbilical area with dilute Marcaine.  A #11 blade was used to make a 5 mm horizontal incision.  The Veress needle was now  passed into the abdominal cavity and 3.2 L of CO2 were infiltrated.  The Veress needle was withdrawn, the 5 mm disposable trocar was now passed through the incision into the abdominal cavity, the obturator was removed as the laparoscope placed through the sleeve.  The patient was placed in Trendelenburg and uterus was elevated.  It was deemed possible to continue with laparoscopic approach.  The 8 mm AirSeal device was now placed in the right lower quadrant under direct visualization and a 10 mm port placed in the left lower quadrant under direct visualization, both were placed after infiltrating with dilute Marcaine.  Ace Harmonic was now employed as well as other manipulating devices to move the uterus, left and right, and to isolate the tubes away from the ovaries.  The left fallopian tube was now pulled away from the left ovary and the fimbria was resected.  The remainder of the fallopian tube was now taken away as the mesosalpinx was cauterized and cut to the proximal attachment of the uterus.  The same procedure was carried on the right side.  The utero-ovarian ligament on both sides were now cauterized and cut with the Ace Harmonic.  We proceeded alongside the uterus as the round ligament and then the broad ligament were both cauterized and cut.  This exposed the uterine vessels.  At this time, we did have to encounter some bladder adhesions.  There was an adhesion that was anterior.  This was turned out to be thin and filmy.  This was taken down quickly, but then as we proceeded closer to the lower uterine segment, the adhesions were more severe.  The bladder was taken away from the lower uterine segment in a very tedious fashion, taking small bites.  Once this was deflected down, we were now able to see the uterine vessels more clearly.  These were cauterized and cut on both sides.  Now, we could push the bladder down a little closer to the vagina, completely off the cervix.  We were able to feel the VCare

## 2024-07-02 NOTE — H&P
Subjective:     Mary Ty, MRN: [id number], is a 44 y.o.  female presents with desire for hysterectomy due to heavy, painful menses and U/S showing adenomyosis. . gradually worsening course. See office notes on care.    Patient Active Problem List    Diagnosis Date Noted    Menometrorrhagia 10/17/2022    Adenomyosis 2024    Abnormal uterine bleeding (AUB) 2024    Abnormal uterine bleeding due to adenomyosis 2022    Dysmenorrhea 2019    Tenderness of female pelvic organs 2019    Trichomonas infection 2019    BV (bacterial vaginosis) 2018    History of  delivery 2013    PCOS (polycystic ovarian syndrome) 2013     Past Medical History:   Diagnosis Date    Adenomyosis     Anemia     iron supplement    Anesthesia complication     pt states she is slow to wake up    Gestational diabetes mellitus     History of gestational diabetes     +    Menometrorrhagia     PCOS (polycystic ovarian syndrome) 2013      Past Surgical History:   Procedure Laterality Date     SECTION  ; ; 2015    x 3    DILATION AND CURETTAGE OF UTERUS N/A 2022    DILATATION AND CURETTAGE HYSTEROSCOPY performed by José Miguel Cao MD at [facility] MAIN OR      Medications Prior to Admission: VITAMIN D PO, Take by mouth nightly  APPLE CIDER VINEGAR PO, Take by mouth as needed  Ascorbic Acid (VITAMIN C) 250 MG tablet, Take 1 tablet by mouth nightly  Probiotic Product (PROBIOTIC-10 PO), Take by mouth nightly  Ferrous Sulfate (SLOW FE PO), Take by mouth nightly  Multiple Vitamins-Minerals (MULTIVITAL PO), Take by mouth nightly  No Known Allergies   Social History     Tobacco Use    Smoking status: Never     Passive exposure: Never    Smokeless tobacco: Never   Substance Use Topics    Alcohol use: Not Currently     Alcohol/week: 1.0 standard drink of alcohol     Types: 1 Glasses of wine per week      Family History   Problem Relation Age of Onset    Heart

## 2024-07-02 NOTE — PERIOP NOTE
MD Pope at bedside with patient. Pt VSS stable. Pain and Nausea controlled at this time. Verbal sign out per MD when pacu care is completed. Plan of care continues.

## 2024-07-02 NOTE — BRIEF OP NOTE
BRIEF OP NOTE  Pre-Op Diagnosis: Adenomyosis [N80.03]  PCOS (polycystic ovarian syndrome) [E28.2]  Post-Op Diagnosis: same  Procedure: Procedure(s):  ERAS/ HYSTERECTOMY ABDOMINAL LAPAROSCOPIC/ BILATERAL SALPINGECTOMY    Surgeon: GANESH Plunkett MD  Assistant(s): Dr PJ Kaye  Anesthesia: GETA   Estimated Blood Loss: 50-75cc  Specimens:   ID Type Source Tests Collected by Time Destination   A : UTERUS, BILATERAL TUBES Tissue Uterus SURGICAL PATHOLOGY Guillermo Plunkett MD 7/2/2024 0910       Findings: enlarged boggy uterus c/w adeno  Complications: severe bladder adhesions

## 2024-07-09 ENCOUNTER — PATIENT MESSAGE (OUTPATIENT)
Dept: OBGYN CLINIC | Age: 44
End: 2024-07-09

## 2024-07-11 RX ORDER — TINIDAZOLE 500 MG/1
500 TABLET ORAL 2 TIMES DAILY
Qty: 6 TABLET | Refills: 0 | Status: SHIPPED | OUTPATIENT
Start: 2024-07-11 | End: 2024-07-14

## 2024-07-11 NOTE — TELEPHONE ENCOUNTER
Prescription sent to pharmacy on file per verbal orders by Dr. Pulnkett.    Orders Placed This Encounter    tinidazole (TINDAMAX) 500 MG tablet     Sig: Take 1 tablet by mouth in the morning and at bedtime for 3 days     Dispense:  6 tablet     Refill:  0

## 2024-07-16 ENCOUNTER — OFFICE VISIT (OUTPATIENT)
Dept: OBGYN CLINIC | Age: 44
End: 2024-07-16

## 2024-07-16 VITALS — HEIGHT: 65 IN | BODY MASS INDEX: 25.21 KG/M2 | WEIGHT: 151.3 LBS

## 2024-07-16 DIAGNOSIS — Z98.890 POST-OPERATIVE STATE: ICD-10-CM

## 2024-07-16 DIAGNOSIS — Z09 POSTOPERATIVE EXAMINATION: Primary | ICD-10-CM

## 2024-07-16 PROCEDURE — 99024 POSTOP FOLLOW-UP VISIT: CPT | Performed by: OBSTETRICS & GYNECOLOGY

## 2024-07-16 NOTE — PROGRESS NOTES
Mary presents for postop visit from Hysterectomy Abdominal Laparoscopic/ Bilateral Salpingectomy about 2 weeks ago. Doing well postoperatively.without any bleeding.  Fever: NO Voiding well: YES.  Bowel movements OK: YES.     Pathology results: A:\"UTERUS, BILATERAL TUBES\": CHRONIC CERVICITIS WITH SQUAMOUS METAPLASIA. NON-SECRETORY ENDOMETRIUM     Ht 1.651 m (5' 5\")   Wt 68.6 kg (151 lb 4.8 oz)   LMP  (LMP Unknown)   BMI 25.18 kg/m²     Exam: A&OX3, NAD.  Abdomen:  Non tender Incisions clean, dry, intact    A/P.  Stable Post op condition.  Gradually increase activity. Resumption of sexual activity is not encouraged at this time.  Follow up 4 weeks.

## 2024-07-31 ENCOUNTER — TELEMEDICINE (OUTPATIENT)
Dept: INTERNAL MEDICINE CLINIC | Facility: CLINIC | Age: 44
End: 2024-07-31
Payer: COMMERCIAL

## 2024-07-31 DIAGNOSIS — E28.2 PCOS (POLYCYSTIC OVARIAN SYNDROME): Primary | ICD-10-CM

## 2024-07-31 DIAGNOSIS — Z12.31 VISIT FOR SCREENING MAMMOGRAM: ICD-10-CM

## 2024-07-31 PROBLEM — R10.2 TENDERNESS OF FEMALE PELVIC ORGANS: Status: ACTIVE | Noted: 2019-11-14

## 2024-07-31 PROCEDURE — 99213 OFFICE O/P EST LOW 20 MIN: CPT | Performed by: INTERNAL MEDICINE

## 2024-07-31 ASSESSMENT — ENCOUNTER SYMPTOMS
RESPIRATORY NEGATIVE: 1
GASTROINTESTINAL NEGATIVE: 1

## 2024-07-31 NOTE — PROGRESS NOTES
Mary Ty, was evaluated through a synchronous (real-time) audio-video encounter. The patient (or guardian if applicable) is aware that this is a billable service, which includes applicable co-pays. This Virtual Visit was conducted with patient's (and/or legal guardian's) consent. Patient identification was verified, and a caregiver was present when appropriate.   The patient was located at Home:  [address]  [facility] [address]  Provider was located at Facility (Appt Dept): [address] [place] [address] [address] [place] [id number]  Confirm you are appropriately licensed, registered, or certified to deliver care in the state where the patient is located as indicated above. If you are not or unsure, please re-schedule the visit: Yes, I confirm.     Mary Ty (:  1980) is a Established patient, presenting virtually for evaluation of the following:    Assessment & Plan   Below is the assessment and plan developed based on review of pertinent history, physical exam, labs, studies, and medications.  1. PCOS (polycystic ovarian syndrome)  Stable, s/p recent hysterectomy and feeling well;     2. Visit for screening mammogram   -PRESTON DIGITAL SCREEN W OR WO CAD BILATERAL [[id number]] (Order [id number])     Return in about 10 months (around 2025), or if symptoms worsen or fail to improve, for CPX w/ labs at outside lab.       Subjective   HPI Here for f/u, was previously seeing Hannah Rodriguez NP in our office; she has hx of PCOS, s/p hysterectomy and salpingectomy; she had fasting labs done at work in April and will bring copy for my review;   She is otherwise feeling well, denies CP, SOB or other concerning symptoms;    Review of Systems   Constitutional: Negative.    HENT: Negative.     Respiratory: Negative.     Cardiovascular: Negative.    Gastrointestinal: Negative.           Objective   Patient-Reported Vitals  No data recorded     Physical Exam  Neurological:      Mental

## 2024-08-08 ENCOUNTER — OFFICE VISIT (OUTPATIENT)
Dept: OBGYN CLINIC | Age: 44
End: 2024-08-08

## 2024-08-08 VITALS
WEIGHT: 153.5 LBS | DIASTOLIC BLOOD PRESSURE: 60 MMHG | HEIGHT: 65 IN | BODY MASS INDEX: 25.58 KG/M2 | SYSTOLIC BLOOD PRESSURE: 106 MMHG

## 2024-08-08 DIAGNOSIS — Z09 POSTOPERATIVE EXAMINATION: Primary | ICD-10-CM

## 2024-08-08 PROCEDURE — 99024 POSTOP FOLLOW-UP VISIT: CPT | Performed by: OBSTETRICS & GYNECOLOGY

## 2024-08-08 RX ORDER — TINIDAZOLE 500 MG/1
500 TABLET ORAL 2 TIMES DAILY
Qty: 6 TABLET | Refills: 0 | Status: SHIPPED | OUTPATIENT
Start: 2024-08-08 | End: 2024-08-11

## 2024-08-08 NOTE — PROGRESS NOTES
Mary presents for postop visit from Hysterectomy Abdominal Laparoscopic/ Bilateral Salpingectomy  about 6 weeks ago.  Doing well postoperatively.without any bleeding.  Fever: NO Voiding well: YES.  Bowel movements OK: YES.       Pathology results: A:\"UTERUS, BILATERAL TUBES\": CHRONIC CERVICITIS WITH SQUAMOUS METAPLASIA. NON-SECRETORY ENDOMETRIUM     Exam: A&OX3, NAD.  Abdomen:  Non tender Incisions clean, dry, intact X 3  BUSEG wnl, cuff intact, POS BV likely, sending Rx   A/P.  Stable Post op condition.  Gradually increase activity. Resumption of sexual activity is  encouraged in 1 more week. Follow up 1 year  Tindamax 500 mg BID X 3 days

## 2024-10-17 ENCOUNTER — PATIENT MESSAGE (OUTPATIENT)
Dept: OBGYN CLINIC | Age: 44
End: 2024-10-17

## 2024-10-17 RX ORDER — FLUCONAZOLE 150 MG/1
150 TABLET ORAL
Qty: 2 TABLET | Refills: 0 | Status: SHIPPED | OUTPATIENT
Start: 2024-10-17 | End: 2024-10-21

## 2024-10-17 NOTE — TELEPHONE ENCOUNTER
Patient is resting prescription for Diflucan.   Complaining a yeast infection.   Last seen on 8/8/24 for post-op check from Hysterectomy.   Patient notified will need an appointment if symptoms are still present after taking Diflucan.

## 2024-10-28 SDOH — ECONOMIC STABILITY: FOOD INSECURITY: WITHIN THE PAST 12 MONTHS, THE FOOD YOU BOUGHT JUST DIDN'T LAST AND YOU DIDN'T HAVE MONEY TO GET MORE.: NEVER TRUE

## 2024-10-28 SDOH — ECONOMIC STABILITY: FOOD INSECURITY: WITHIN THE PAST 12 MONTHS, YOU WORRIED THAT YOUR FOOD WOULD RUN OUT BEFORE YOU GOT MONEY TO BUY MORE.: NEVER TRUE

## 2024-10-28 SDOH — ECONOMIC STABILITY: INCOME INSECURITY: HOW HARD IS IT FOR YOU TO PAY FOR THE VERY BASICS LIKE FOOD, HOUSING, MEDICAL CARE, AND HEATING?: NOT VERY HARD

## 2024-10-28 SDOH — ECONOMIC STABILITY: TRANSPORTATION INSECURITY
IN THE PAST 12 MONTHS, HAS LACK OF TRANSPORTATION KEPT YOU FROM MEETINGS, WORK, OR FROM GETTING THINGS NEEDED FOR DAILY LIVING?: NO

## 2024-10-28 ASSESSMENT — PATIENT HEALTH QUESTIONNAIRE - PHQ9
SUM OF ALL RESPONSES TO PHQ9 QUESTIONS 1 & 2: 0
1. LITTLE INTEREST OR PLEASURE IN DOING THINGS: NOT AT ALL
SUM OF ALL RESPONSES TO PHQ QUESTIONS 1-9: 0
SUM OF ALL RESPONSES TO PHQ QUESTIONS 1-9: 0
1. LITTLE INTEREST OR PLEASURE IN DOING THINGS: NOT AT ALL
SUM OF ALL RESPONSES TO PHQ QUESTIONS 1-9: 0
2. FEELING DOWN, DEPRESSED OR HOPELESS: NOT AT ALL
SUM OF ALL RESPONSES TO PHQ QUESTIONS 1-9: 0
SUM OF ALL RESPONSES TO PHQ9 QUESTIONS 1 & 2: 0
2. FEELING DOWN, DEPRESSED OR HOPELESS: NOT AT ALL

## 2024-10-28 NOTE — PROGRESS NOTES
The patient is a 44 y.o.  who is seen for yeast infection. Patient recently took Diflucan 150 mg: first tablet on 10/18 and 2nd pill on . Still having vaginal discharge. Denies itching. Denies odor.     HISTORY:    No LMP recorded (lmp unknown). Patient has had a hysterectomy.  Sexual History:  has sex with males  Contraception:  status post hysterectomy  Current Outpatient Medications on File Prior to Visit   Medication Sig Dispense Refill    VITAMIN D PO Take by mouth nightly      APPLE CIDER VINEGAR PO Take by mouth as needed      Ascorbic Acid (VITAMIN C) 250 MG tablet Take 1 tablet by mouth nightly      Probiotic Product (PROBIOTIC-10 PO) Take by mouth nightly      Multiple Vitamins-Minerals (MULTIVITAL PO) Take by mouth nightly      Ferrous Sulfate (SLOW FE PO) Take by mouth nightly (Patient not taking: Reported on 10/29/2024)       No current facility-administered medications on file prior to visit.       ROS:  Feeling well. No dyspnea or chest pain on exertion.  No abdominal pain, change in bowel habits, black or bloody stools.  No urinary tract symptoms. GYN ROS: no breast pain or new or enlarging lumps on self exam.    PHYSICAL EXAM:  Blood pressure 128/74, height 1.651 m (5' 5\"), weight 69.7 kg (153 lb 11.2 oz).    The patient appears well, alert, oriented x 3, in no distress.   Abdomen soft without tenderness, guarding, mass or organomegaly.  Pelvic: VULVA: normal appearing vulva with no masses, tenderness or lesions, VAGINA: normal appearing vagina with cuff showing bright red granulation tissue. No dehiscence. CERVIX: surgically absent, UTERUS: surgically absent    ASSESSMENT:    Vaginal cuff granulation tissue formation  Treated by debridement and AgNO3 sticks     PLAN:  All questions answered  Diagnosis explained in detail, including differential  Follow-up as needed, wait on nuswab for BV and yeast results

## 2024-10-29 ENCOUNTER — OFFICE VISIT (OUTPATIENT)
Dept: OBGYN CLINIC | Age: 44
End: 2024-10-29
Payer: COMMERCIAL

## 2024-10-29 ENCOUNTER — OFFICE VISIT (OUTPATIENT)
Dept: INTERNAL MEDICINE CLINIC | Facility: CLINIC | Age: 44
End: 2024-10-29
Payer: COMMERCIAL

## 2024-10-29 VITALS
BODY MASS INDEX: 25.61 KG/M2 | WEIGHT: 153.7 LBS | DIASTOLIC BLOOD PRESSURE: 74 MMHG | HEIGHT: 65 IN | SYSTOLIC BLOOD PRESSURE: 128 MMHG

## 2024-10-29 VITALS
DIASTOLIC BLOOD PRESSURE: 70 MMHG | HEIGHT: 65 IN | TEMPERATURE: 97.2 F | OXYGEN SATURATION: 100 % | SYSTOLIC BLOOD PRESSURE: 110 MMHG | HEART RATE: 75 BPM | BODY MASS INDEX: 25.52 KG/M2 | WEIGHT: 153.2 LBS

## 2024-10-29 DIAGNOSIS — N93.9 VAGINAL BLEEDING: Primary | ICD-10-CM

## 2024-10-29 DIAGNOSIS — H93.12 TINNITUS OF LEFT EAR: Primary | ICD-10-CM

## 2024-10-29 DIAGNOSIS — N76.1 SUBACUTE VAGINITIS: ICD-10-CM

## 2024-10-29 DIAGNOSIS — N89.8 VAGINAL DISCHARGE: ICD-10-CM

## 2024-10-29 DIAGNOSIS — J01.10 ACUTE NON-RECURRENT FRONTAL SINUSITIS: ICD-10-CM

## 2024-10-29 PROCEDURE — 99213 OFFICE O/P EST LOW 20 MIN: CPT | Performed by: OBSTETRICS & GYNECOLOGY

## 2024-10-29 PROCEDURE — 99213 OFFICE O/P EST LOW 20 MIN: CPT | Performed by: NURSE PRACTITIONER

## 2024-10-29 RX ORDER — AMOXICILLIN 875 MG/1
875 TABLET, COATED ORAL 2 TIMES DAILY
Qty: 20 TABLET | Refills: 0 | Status: SHIPPED | OUTPATIENT
Start: 2024-10-29 | End: 2024-11-08

## 2024-10-29 RX ORDER — FLUTICASONE PROPIONATE 50 MCG
2 SPRAY, SUSPENSION (ML) NASAL DAILY
Qty: 16 G | Refills: 0 | Status: SHIPPED | OUTPATIENT
Start: 2024-10-29

## 2024-10-29 RX ORDER — FLUCONAZOLE 150 MG/1
150 TABLET ORAL ONCE
Qty: 1 TABLET | Refills: 0 | Status: SHIPPED | OUTPATIENT
Start: 2024-10-29 | End: 2024-10-29

## 2024-10-29 SDOH — ECONOMIC STABILITY: FOOD INSECURITY: WITHIN THE PAST 12 MONTHS, THE FOOD YOU BOUGHT JUST DIDN'T LAST AND YOU DIDN'T HAVE MONEY TO GET MORE.: PATIENT DECLINED

## 2024-10-29 SDOH — ECONOMIC STABILITY: INCOME INSECURITY: HOW HARD IS IT FOR YOU TO PAY FOR THE VERY BASICS LIKE FOOD, HOUSING, MEDICAL CARE, AND HEATING?: PATIENT DECLINED

## 2024-10-29 SDOH — ECONOMIC STABILITY: FOOD INSECURITY: WITHIN THE PAST 12 MONTHS, YOU WORRIED THAT YOUR FOOD WOULD RUN OUT BEFORE YOU GOT MONEY TO BUY MORE.: PATIENT DECLINED

## 2024-10-29 ASSESSMENT — ENCOUNTER SYMPTOMS
VOMITING: 0
NAUSEA: 0
SINUS PRESSURE: 0
WHEEZING: 0
COUGH: 1
SORE THROAT: 1
RHINORRHEA: 0
SHORTNESS OF BREATH: 0
ABDOMINAL PAIN: 0

## 2024-10-29 NOTE — PROGRESS NOTES
HPI:  Ms. Ty is a 44 y.o.   OB History          3    Para   3    Term   3            AB        Living   3         SAB        IAB        Ectopic        Molar        Multiple        Live Births   3             who is here today for a well woman exam. She complains of nothing.      Date Performed Result   PAP 10/18/2023 Negative. HPV not tested.   History of hysterectomy on 24.    Mammogram N/A: Scheduled for     Colonoscopy N/A    Dexa N/A                  GYN History           No LMP recorded (lmp unknown). Patient has had a hysterectomy. Cycle Length {Numbers; 0-100:93404} Lasting {Numbers; 0-10:76638}  {Pos/neg trace:92453} dysmenorrhea; {Pos/neg trace:69983} postcoital bleeding    Past Medical History:  Past Medical History:   Diagnosis Date    Adenomyosis     Anemia     iron supplement    Anesthesia complication     pt states she is slow to wake up    Gestational diabetes mellitus     History of gestational diabetes     +    History of mammogram 2023    Menometrorrhagia     PCOS (polycystic ovarian syndrome) 2013       Past Surgical History:  Past Surgical History:   Procedure Laterality Date     SECTION  ; ; 2015    x 3    DILATION AND CURETTAGE OF UTERUS N/A 2022    DILATATION AND CURETTAGE HYSTEROSCOPY performed by José Miguel Cao MD at [facility] MAIN OR    HYSTERECTOMY (CERVIX STATUS UNKNOWN) N/A 2024    ERAS/ HYSTERECTOMY ABDOMINAL LAPAROSCOPIC/ BILATERAL SALPINGECTOMY performed by Guillermo Plunkett MD at [facility] MAIN OR    WISDOM TOOTH EXTRACTION         Allergies:   No Known Allergies    Medication History:  Current Outpatient Medications   Medication Sig Dispense Refill    VITAMIN D PO Take by mouth nightly      APPLE CIDER VINEGAR PO Take by mouth as needed      Ascorbic Acid (VITAMIN C) 250 MG tablet Take 1 tablet by mouth nightly      Probiotic Product (PROBIOTIC-10 PO) Take by mouth nightly      Ferrous Sulfate (SLOW FE PO) Take by mouth

## 2024-10-29 NOTE — PROGRESS NOTES
[facility]      10/29/2024    Patient Name: Mary Ty  :  1980      Chief Complaint:  Chief Complaint   Patient presents with    Other     Pt would like a referral to ent.          HPI  Patient presents today requesting referral to ENT. She reports tinnitus that she describes as a \"fluttering sound\" in her left ear x 1 month. She reports associated muffled hearing in her left ear as well. Symptoms are worst in the morning and seem to improve as the day progresses. No ear pain or drainage.     Patient also with complaint of postnasal drainage, sore throat and cough for the past 7-10 days. She denies any fever or chills. No SOB or wheezing. Has taken Advil and a decongestant with help temporarily.         Past Medical History:   Diagnosis Date    Adenomyosis     Anemia     iron supplement    Anesthesia complication     pt states she is slow to wake up    Gestational diabetes mellitus     History of gestational diabetes     +    History of mammogram 2023    Menometrorrhagia     PCOS (polycystic ovarian syndrome) 2013       Past Surgical History:   Procedure Laterality Date     SECTION  ; ; 2015    x 3    DILATION AND CURETTAGE OF UTERUS N/A 2022    DILATATION AND CURETTAGE HYSTEROSCOPY performed by José Miguel Cao MD at [facility] MAIN OR    HYSTERECTOMY (CERVIX STATUS UNKNOWN) N/A 2024    ERAS/ HYSTERECTOMY ABDOMINAL LAPAROSCOPIC/ BILATERAL SALPINGECTOMY performed by Guillermo Plunkett MD [facility] [place]    WISDOM TOOTH EXTRACTION         Family History   Problem Relation Age of Onset    Heart Disease Mother         CHF    Cancer Mother 68        multiple myeloma    High Cholesterol Mother     Lung Disease Father     Other Father         sarcoidosis    Cancer Father         multiple myeloma    Dementia Father     Heart Disease Maternal Grandmother     Heart Disease Maternal Grandfather     Colon Cancer Paternal Grandmother        Social History     Tobacco Use

## 2024-10-31 LAB
A VAGINAE DNA VAG QL NAA+PROBE: ABNORMAL SCORE
BVAB2 DNA VAG QL NAA+PROBE: ABNORMAL SCORE
C ALBICANS DNA VAG QL NAA+PROBE: NEGATIVE
C GLABRATA DNA VAG QL NAA+PROBE: NEGATIVE
MEGA1 DNA VAG QL NAA+PROBE: ABNORMAL SCORE
SPECIMEN SOURCE: ABNORMAL

## 2024-11-04 ENCOUNTER — TELEPHONE (OUTPATIENT)
Dept: OBGYN CLINIC | Age: 44
End: 2024-11-04

## 2024-11-04 RX ORDER — TINIDAZOLE 500 MG/1
500 TABLET ORAL 2 TIMES DAILY
Qty: 6 TABLET | Refills: 0 | Status: SHIPPED | OUTPATIENT
Start: 2024-11-04 | End: 2024-11-07

## 2024-11-04 NOTE — TELEPHONE ENCOUNTER
----- Message from Dr. Guillermo Plunkett MD sent at 11/4/2024  9:42 AM EST -----  Regarding: nuswab  Has BV from the spotting, negative for yeast  Tindamax 500mg BID X 3 days  #6 pills  ----- Message -----  From: Tere Redd Incoming [facility] [employer]  Sent: 10/31/2024   7:37 PM EST  To: Guillermo Plunkett MD

## 2024-12-14 ENCOUNTER — HOSPITAL ENCOUNTER (OUTPATIENT)
Dept: MAMMOGRAPHY | Age: 44
Discharge: HOME | End: 2024-12-17
Payer: COMMERCIAL

## 2024-12-14 DIAGNOSIS — Z12.31 VISIT FOR SCREENING MAMMOGRAM: ICD-10-CM

## 2024-12-14 PROCEDURE — 77063 BREAST TOMOSYNTHESIS BI: CPT

## 2025-01-28 NOTE — PERIOP NOTE
The below lab results are within anesthesia guidelines, no follow-up required. Labs automatically routed to ordering provider via Epic documentation.     Latest Reference Range & Units 06/12/24 12:02   Hemoglobin Quant 11.7 - 15.4 g/dL 10.4 (L)   (L): Data is abnormally low    
PROVIDER:[TOKEN:[[id number]],FOLLOWUP:[1 week],ESTABLISHEDPATIENT:[T]]

## 2025-05-20 NOTE — PROGRESS NOTES
Unknown (10/29/2024)    Housing Stability Vital Sign     Unable to Pay for Housing in the Last Year: Not on file     Number of Times Moved in the Last Year: Not on file     Homeless in the Last Year: Patient declined      Past Surgical History:   Procedure Laterality Date     SECTION  ; ; 2015    x 3    DILATION AND CURETTAGE OF UTERUS N/A 2022    DILATATION AND CURETTAGE HYSTEROSCOPY performed by José Miguel Cao MD at [facility] MAIN OR    HYSTERECTOMY (CERVIX STATUS UNKNOWN) N/A 2024    ERAS/ HYSTERECTOMY ABDOMINAL LAPAROSCOPIC/ BILATERAL SALPINGECTOMY performed by Guillermo Plunkett MD at [facility] MAIN OR    WISDOM TOOTH EXTRACTION        OB History    Para Term  AB Living   3 3 3   3   SAB IAB Ectopic Molar Multiple Live Births        3      # Outcome Date GA Lbr Eleuterio/2nd Weight Sex Type Anes PTL Lv   3 Term 08/17/15 37w3d  2.94 kg (6 lb 7.7 oz) F   N DENNISE      Complications: Fetal Intolerance   2 Term 13 40w1d  3.95 kg (8 lb 11.3 oz) F   N DENNISE   1 Term     F    DENNISE

## 2025-05-21 ENCOUNTER — OFFICE VISIT (OUTPATIENT)
Dept: OBGYN CLINIC | Age: 45
End: 2025-05-21
Payer: COMMERCIAL

## 2025-05-21 VITALS
WEIGHT: 156.3 LBS | SYSTOLIC BLOOD PRESSURE: 108 MMHG | DIASTOLIC BLOOD PRESSURE: 66 MMHG | HEIGHT: 65 IN | BODY MASS INDEX: 26.04 KG/M2

## 2025-05-21 DIAGNOSIS — N89.8 VAGINAL IRRITATION: ICD-10-CM

## 2025-05-21 DIAGNOSIS — N89.8 VAGINAL DISCHARGE: Primary | ICD-10-CM

## 2025-05-21 PROCEDURE — 99213 OFFICE O/P EST LOW 20 MIN: CPT | Performed by: OBSTETRICS & GYNECOLOGY

## 2025-05-21 RX ORDER — FLUCONAZOLE 150 MG/1
150 TABLET ORAL
Qty: 2 TABLET | Refills: 3 | Status: SHIPPED | OUTPATIENT
Start: 2025-05-21 | End: 2025-05-27

## 2025-05-21 RX ORDER — TINIDAZOLE 500 MG/1
500 TABLET ORAL 2 TIMES DAILY
Qty: 10 TABLET | Refills: 3 | Status: SHIPPED | OUTPATIENT
Start: 2025-05-21 | End: 2025-05-26

## 2025-05-25 LAB
A VAGINAE DNA VAG QL NAA+PROBE: ABNORMAL SCORE
BVAB2 DNA VAG QL NAA+PROBE: ABNORMAL SCORE
C ALBICANS DNA VAG QL NAA+PROBE: NEGATIVE
C GLABRATA DNA VAG QL NAA+PROBE: NEGATIVE
C TRACH RRNA SPEC QL NAA+PROBE: NEGATIVE
MEGA1 DNA VAG QL NAA+PROBE: ABNORMAL SCORE
N GONORRHOEA RRNA SPEC QL NAA+PROBE: NEGATIVE
SPECIMEN SOURCE: ABNORMAL
T VAGINALIS RRNA SPEC QL NAA+PROBE: NEGATIVE

## 2025-05-27 ENCOUNTER — RESULTS FOLLOW-UP (OUTPATIENT)
Dept: OBGYN CLINIC | Age: 45
End: 2025-05-27

## 2025-05-27 RX ORDER — TINIDAZOLE 500 MG/1
500 TABLET ORAL 2 TIMES DAILY
Qty: 10 TABLET | Refills: 0 | Status: SHIPPED | OUTPATIENT
Start: 2025-05-27 | End: 2025-06-01

## (undated) DEVICE — SOLUTION IRRIG 3000ML 0.9% SOD CHL USP UROMATIC PLAS CONT

## (undated) DEVICE — SHEARS ENDOSCP HARM 36CM ULTRASONIC CRV TIP UPGRD

## (undated) DEVICE — CARDINAL HEALTH FLEXIBLE LIGHT HANDLE COVER: Brand: CARDINAL HEALTH

## (undated) DEVICE — AGENT HEMSTAT 3GM OXIDIZED REGENERATED CELOS ABSRB FOR CONT (ORDER MULTIPLES OF 5EA)

## (undated) DEVICE — DRAPE TWL SURG 16X26IN BLU ORB04] ALLCARE INC]

## (undated) DEVICE — SYRINGE MED 10ML TRNSLUC BRL PLUNG BLK MRK POLYPR CTRL

## (undated) DEVICE — AIRSEAL 5 MM ACCESS PORT AND LOW PROFILE OBTURATOR WITH BLADELESS OPTICAL TIP, 120 MM LENGTH: Brand: AIRSEAL

## (undated) DEVICE — SYRINGE MED 10ML LUERLOCK TIP W/O SFTY DISP

## (undated) DEVICE — DRAPE,T,CYSTO,STERILE: Brand: MEDLINE

## (undated) DEVICE — PAD,NON-ADHERENT,3X8,STERILE,LF,1/PK: Brand: MEDLINE

## (undated) DEVICE — GARMENT,MEDLINE,DVT,INT,CALF,MED, GEN2: Brand: MEDLINE

## (undated) DEVICE — INTENDED FOR TISSUE SEPARATION, AND OTHER PROCEDURES THAT REQUIRE A SHARP SURGICAL BLADE TO PUNCTURE OR CUT.: Brand: BARD-PARKER ® STAINLESS STEEL BLADES

## (undated) DEVICE — VCARE MEDIUM, UTERINE MANIPULATOR, VAGINAL-CERVICAL-AHLUWALIA'S-RETRACTOR-ELEVATOR: Brand: VCARE

## (undated) DEVICE — 48" PROBE COVER W/GEL, ULTRASOUND, STERILE: Brand: SITE-RITE

## (undated) DEVICE — MINOR LITHOTOMY PACK: Brand: MEDLINE INDUSTRIES, INC.

## (undated) DEVICE — CANISTER, RIGID, 2000CC: Brand: MEDLINE INDUSTRIES, INC.

## (undated) DEVICE — SUTURE VICRYL SZ 0 L36IN ABSRB UD CT-1 L36MM 1/2 CIR TAPR PNT VCP946H

## (undated) DEVICE — APPLICATOR MEDICATED 26 CC SOLUTION HI LT ORNG CHLORAPREP

## (undated) DEVICE — SUTURE VICRYL COAT SZ 4-0 L18IN ABSRB UD L19MM PS-2 1/2 CIR J496G

## (undated) DEVICE — SUTURE VCRL SZ 2-0 L27IN ABSRB UD L26MM SH 1/2 CIR J417H

## (undated) DEVICE — SOLUTION IRRIG 1000ML 0.9% SOD CHL USP POUR PLAS BTL

## (undated) DEVICE — SUTURE STRATAFIX SPRL PDS + SZ 2-0 L6IN ABSRB VLT L36MM SXPP1B409

## (undated) DEVICE — TROCAR: Brand: KII FIOS FIRST ENTRY

## (undated) DEVICE — GOWN,REINF,POLY,ECL,PP SLV,XL: Brand: MEDLINE

## (undated) DEVICE — GYN LAPAROSCOPY: Brand: MEDLINE INDUSTRIES, INC.

## (undated) DEVICE — SURGICEL ENDOSCP APPL

## (undated) DEVICE — SYRINGE IRRIG 60ML SFT PLIABLE BLB EZ TO GRP 1 HND USE W/

## (undated) DEVICE — LIQUIBAND RAPID ADHESIVE 36/CS 0.8ML: Brand: MEDLINE

## (undated) DEVICE — TRI-LUMEN FILTERED TUBE SET WITH ACTIVATED CHARCOAL FILTER: Brand: AIRSEAL

## (undated) DEVICE — INSUFFLATION NEEDLE TO ESTABLISH PNEUMOPERITONEUM.: Brand: INSUFFLATION NEEDLE

## (undated) DEVICE — PAD PT POS 36 IN SURGYPAD DISP

## (undated) DEVICE — CATHETER URETH 16FR RED RUB INTMIT ALL PURP 12 PER CA

## (undated) DEVICE — LAPAROSCOPIC TROCAR SLEEVE/SINGLE USE: Brand: KII® OPTICAL ACCESS SYSTEM

## (undated) DEVICE — GLOVE SURG SZ 75 CRM LTX FREE POLYISOPRENE POLYMER BEAD ANTI

## (undated) DEVICE — DRAPE,UNDERBUTTOCKS,PCH,STERILE: Brand: MEDLINE